# Patient Record
Sex: FEMALE | Race: AMERICAN INDIAN OR ALASKA NATIVE | NOT HISPANIC OR LATINO | Employment: FULL TIME | URBAN - NONMETROPOLITAN AREA
[De-identification: names, ages, dates, MRNs, and addresses within clinical notes are randomized per-mention and may not be internally consistent; named-entity substitution may affect disease eponyms.]

---

## 2017-05-07 ENCOUNTER — HOSPITAL ENCOUNTER (EMERGENCY)
Facility: HOSPITAL | Age: 32
Discharge: HOME | End: 2017-05-07
Attending: PHYSICIAN ASSISTANT | Admitting: PHYSICIAN ASSISTANT
Payer: MEDICAID

## 2017-05-07 VITALS
HEIGHT: 65 IN | OXYGEN SATURATION: 99 % | DIASTOLIC BLOOD PRESSURE: 74 MMHG | RESPIRATION RATE: 16 BRPM | TEMPERATURE: 98.5 F | HEART RATE: 55 BPM | SYSTOLIC BLOOD PRESSURE: 130 MMHG

## 2017-05-07 DIAGNOSIS — R07.89 ATYPICAL CHEST PAIN: ICD-10-CM

## 2017-05-07 LAB
ALBUMIN SERPL-MCNC: 3.7 G/DL (ref 3.4–5)
ALP SERPL-CCNC: 117 U/L (ref 40–150)
ALT SERPL W P-5'-P-CCNC: 22 U/L (ref 0–50)
ANION GAP SERPL CALCULATED.3IONS-SCNC: 10 MMOL/L (ref 3–14)
AST SERPL W P-5'-P-CCNC: 10 U/L (ref 0–45)
BASOPHILS # BLD AUTO: 0 10E9/L (ref 0–0.2)
BASOPHILS NFR BLD AUTO: 0.2 %
BILIRUB SERPL-MCNC: 0.3 MG/DL (ref 0.2–1.3)
BUN SERPL-MCNC: 7 MG/DL (ref 7–30)
CALCIUM SERPL-MCNC: 9.4 MG/DL (ref 8.5–10.1)
CHLORIDE SERPL-SCNC: 107 MMOL/L (ref 94–109)
CO2 SERPL-SCNC: 23 MMOL/L (ref 20–32)
CREAT SERPL-MCNC: 0.53 MG/DL (ref 0.52–1.04)
D DIMER PPP DDU-MCNC: <200 NG/ML D-DU (ref 0–300)
DIFFERENTIAL METHOD BLD: ABNORMAL
EOSINOPHIL # BLD AUTO: 0.1 10E9/L (ref 0–0.7)
EOSINOPHIL NFR BLD AUTO: 0.5 %
ERYTHROCYTE [DISTWIDTH] IN BLOOD BY AUTOMATED COUNT: 14.1 % (ref 10–15)
GFR SERPL CREATININE-BSD FRML MDRD: NORMAL ML/MIN/1.7M2
GLUCOSE SERPL-MCNC: 89 MG/DL (ref 70–99)
HCT VFR BLD AUTO: 41.3 % (ref 35–47)
HGB BLD-MCNC: 13.2 G/DL (ref 11.7–15.7)
IMM GRANULOCYTES # BLD: 0 10E9/L (ref 0–0.4)
IMM GRANULOCYTES NFR BLD: 0.3 %
LYMPHOCYTES # BLD AUTO: 3.3 10E9/L (ref 0.8–5.3)
LYMPHOCYTES NFR BLD AUTO: 34.8 %
MCH RBC QN AUTO: 26.3 PG (ref 26.5–33)
MCHC RBC AUTO-ENTMCNC: 32 G/DL (ref 31.5–36.5)
MCV RBC AUTO: 82 FL (ref 78–100)
MONOCYTES # BLD AUTO: 0.6 10E9/L (ref 0–1.3)
MONOCYTES NFR BLD AUTO: 5.9 %
NEUTROPHILS # BLD AUTO: 5.5 10E9/L (ref 1.6–8.3)
NEUTROPHILS NFR BLD AUTO: 58.3 %
NRBC # BLD AUTO: 0 10*3/UL
NRBC BLD AUTO-RTO: 0 /100
PLATELET # BLD AUTO: 368 10E9/L (ref 150–450)
POTASSIUM SERPL-SCNC: 4 MMOL/L (ref 3.4–5.3)
PROT SERPL-MCNC: 8.2 G/DL (ref 6.8–8.8)
RBC # BLD AUTO: 5.02 10E12/L (ref 3.8–5.2)
SODIUM SERPL-SCNC: 140 MMOL/L (ref 133–144)
TROPONIN I SERPL-MCNC: NORMAL UG/L (ref 0–0.04)
WBC # BLD AUTO: 9.5 10E9/L (ref 4–11)

## 2017-05-07 PROCEDURE — 85025 COMPLETE CBC W/AUTO DIFF WBC: CPT | Performed by: PHYSICIAN ASSISTANT

## 2017-05-07 PROCEDURE — 85379 FIBRIN DEGRADATION QUANT: CPT | Performed by: PHYSICIAN ASSISTANT

## 2017-05-07 PROCEDURE — 71020 ZZHC CHEST TWO VIEWS, FRONT/LAT: CPT | Mod: TC

## 2017-05-07 PROCEDURE — 84484 ASSAY OF TROPONIN QUANT: CPT | Performed by: PHYSICIAN ASSISTANT

## 2017-05-07 PROCEDURE — 99285 EMERGENCY DEPT VISIT HI MDM: CPT | Mod: 25

## 2017-05-07 PROCEDURE — 80053 COMPREHEN METABOLIC PANEL: CPT | Performed by: PHYSICIAN ASSISTANT

## 2017-05-07 PROCEDURE — 99284 EMERGENCY DEPT VISIT MOD MDM: CPT | Performed by: PHYSICIAN ASSISTANT

## 2017-05-07 PROCEDURE — 93010 ELECTROCARDIOGRAM REPORT: CPT | Performed by: INTERNAL MEDICINE

## 2017-05-07 PROCEDURE — 36415 COLL VENOUS BLD VENIPUNCTURE: CPT | Performed by: PHYSICIAN ASSISTANT

## 2017-05-07 PROCEDURE — 93005 ELECTROCARDIOGRAM TRACING: CPT

## 2017-05-07 PROCEDURE — 96374 THER/PROPH/DIAG INJ IV PUSH: CPT

## 2017-05-07 PROCEDURE — 25000128 H RX IP 250 OP 636: Performed by: PHYSICIAN ASSISTANT

## 2017-05-07 RX ORDER — HYDROCODONE BITARTRATE AND ACETAMINOPHEN 5; 325 MG/1; MG/1
1-2 TABLET ORAL EVERY 4 HOURS PRN
Qty: 15 TABLET | Refills: 0 | Status: SHIPPED | OUTPATIENT
Start: 2017-05-07 | End: 2017-07-20

## 2017-05-07 RX ORDER — KETOROLAC TROMETHAMINE 30 MG/ML
30 INJECTION, SOLUTION INTRAMUSCULAR; INTRAVENOUS ONCE
Status: COMPLETED | OUTPATIENT
Start: 2017-05-07 | End: 2017-05-07

## 2017-05-07 RX ADMIN — KETOROLAC TROMETHAMINE 30 MG: 30 INJECTION, SOLUTION INTRAMUSCULAR at 11:14

## 2017-05-07 ASSESSMENT — ENCOUNTER SYMPTOMS
FEVER: 0
FATIGUE: 0
BACK PAIN: 1
APPETITE CHANGE: 0
ABDOMINAL PAIN: 0
ACTIVITY CHANGE: 0
SHORTNESS OF BREATH: 0
HEADACHES: 0
DIZZINESS: 0
NAUSEA: 0
VOMITING: 0
LIGHT-HEADEDNESS: 0
PALPITATIONS: 0
CHEST TIGHTNESS: 0

## 2017-05-07 NOTE — ED NOTES
Pt instructed to not drive, drink etoh, or do anything requiring alertness while taking meds, to take with food, and fruit,fiber, vegetables and liquids to avoid constipation. Pt verbalizes understanding.

## 2017-05-07 NOTE — ED PROVIDER NOTES
History     Chief Complaint   Patient presents with     Chest Pain     chest pain for the last week right anterior chest that is no longer intermittent.      The history is provided by the patient.     Jaycee Campos is a 31 year old female who presented to the ED ambulatory for evaluation of chest pain.  The pain is described as sharp and located just right of the sternum.  She has been experiencing the pain intermittently for approx one week.  Last night the pains became constant.  No fevers.  No cough.  No dyspnea.  Pain is 6/10 scale.  Radiates to the back and right shoulder.  Improved with rasing her arm over her head.      CAD risk: early MI in father.      I have reviewed the Medications, Allergies, Past Medical and Surgical History, and Social History in the Epic system.    Review of Systems   Constitutional: Negative for activity change, appetite change, fatigue and fever.   HENT: Negative.    Respiratory: Negative for chest tightness and shortness of breath.    Cardiovascular: Positive for chest pain. Negative for palpitations and leg swelling.   Gastrointestinal: Negative for abdominal pain, nausea and vomiting.   Genitourinary: Negative.    Musculoskeletal: Positive for back pain.   Skin: Negative.    Neurological: Negative for dizziness, light-headedness and headaches.       Physical Exam      Physical Exam   Constitutional: She is oriented to person, place, and time. She appears well-developed and well-nourished.   HENT:   Head: Normocephalic and atraumatic.   Right Ear: External ear normal.   Left Ear: External ear normal.   Eyes: Conjunctivae and EOM are normal.   Cardiovascular: Normal rate and regular rhythm.    Pulmonary/Chest: Effort normal and breath sounds normal. No respiratory distress. She exhibits no tenderness.   Abdominal: Soft. There is no tenderness. There is no guarding.   No epigastric or RUQ pain    Neurological: She is alert and oriented to person, place, and time.   Skin: Skin  is warm and dry.   Psychiatric: She has a normal mood and affect.   Nursing note and vitals reviewed.      ED Course     ED Course     Procedures        EKG shows a NSR without ST or T wave concerns.      CXR shows no widened mediastinum, pneumothorax, or focal consolidation.     PERC Rule for risk stratifying PE to low risk (calculator)  Background  Risk stratifies patients to low risk of PE if all 8 criteria are present including age <50, heart rate <100, O2 Sat >94%, no unilateral leg edema, no hemoptysis, no recent surgery or trauma, no prior VTE, and no hormone use.  Data  31 year old   does not have a problem list on file.  @cmedp@   has no past surgical history on file.  Pulse: 68  SpO2: 97 %  Criteria   Of  8 possible items (all criteria must be present):  Age <50 years  Heart rate <100 bpm  Oxygen Saturation >94%  No unilateral leg swelling  No hemoptysis  No surgery or trauma within 4 weeks  No prior DVT or PE  No hormone use (oral, transdermal and intravaginal estrogens)  Interpretation  All eight criteria are met AND low clinical PE suspicion: No further evaluation for PE required          HEART Score  Background  Calculates the overall risk of adverse event in patient's presenting with chest pain.  Based on 5 criteria (each assigned 0-2 points) including suspiciousness of history, EKG, age, risk factors and troponin.    Data  31 year old female   does not have a problem list on file.   reports that she has never smoked. She does not have any smokeless tobacco history on file.  family history is not on file.  Lab Results   Component Value Date    TROPI  05/07/2017     <0.015  The 99th percentile for upper reference range is 0.045 ug/L.  Troponin values in   the range of 0.045 - 0.120 ug/L may be associated with risks of adverse   clinical events.       Criteria   0-2 points for each of 5 items (maximum of 10 points):  Score 1- History moderately suspicious for coronary syndrome  Score 0- EKG  Normal  Score 0- Age <45 years old  Score 1- One to 2 risk factors for atherosclerotic disease  Score 0- Within normal limits for troponin levels  Interpretation  Risk of adverse outcome  Heart Score: 2  Total Score 0-3- Adverse Outcome Risk 2.5% - Supports early discharge with appropriate follow-up    Medications   sodium chloride (PF) 0.9% PF flush 3 mL (not administered)   sodium chloride (PF) 0.9% PF flush 3 mL (3 mLs Intracatheter Given 5/7/17 1114)   ketorolac (TORADOL) injection 30 mg (30 mg Intravenous Given 5/7/17 1114)     Results for orders placed or performed during the hospital encounter of 05/07/17 (from the past 24 hour(s))   CBC with platelets differential   Result Value Ref Range    WBC 9.5 4.0 - 11.0 10e9/L    RBC Count 5.02 3.8 - 5.2 10e12/L    Hemoglobin 13.2 11.7 - 15.7 g/dL    Hematocrit 41.3 35.0 - 47.0 %    MCV 82 78 - 100 fl    MCH 26.3 (L) 26.5 - 33.0 pg    MCHC 32.0 31.5 - 36.5 g/dL    RDW 14.1 10.0 - 15.0 %    Platelet Count 368 150 - 450 10e9/L    Diff Method Automated Method     % Neutrophils 58.3 %    % Lymphocytes 34.8 %    % Monocytes 5.9 %    % Eosinophils 0.5 %    % Basophils 0.2 %    % Immature Granulocytes 0.3 %    Nucleated RBCs 0 0 /100    Absolute Neutrophil 5.5 1.6 - 8.3 10e9/L    Absolute Lymphocytes 3.3 0.8 - 5.3 10e9/L    Absolute Monocytes 0.6 0.0 - 1.3 10e9/L    Absolute Eosinophils 0.1 0.0 - 0.7 10e9/L    Absolute Basophils 0.0 0.0 - 0.2 10e9/L    Abs Immature Granulocytes 0.0 0 - 0.4 10e9/L    Absolute Nucleated RBC 0.0    D-Dimer (FV Range)   Result Value Ref Range    D-Dimer ng/mL <200 0 - 300 ng/ml D-DU   Comprehensive metabolic panel   Result Value Ref Range    Sodium 140 133 - 144 mmol/L    Potassium 4.0 3.4 - 5.3 mmol/L    Chloride 107 94 - 109 mmol/L    Carbon Dioxide 23 20 - 32 mmol/L    Anion Gap 10 3 - 14 mmol/L    Glucose 89 70 - 99 mg/dL    Urea Nitrogen 7 7 - 30 mg/dL    Creatinine 0.53 0.52 - 1.04 mg/dL    GFR Estimate >90  Non  GFR Calc    >60 mL/min/1.7m2    GFR Estimate If Black >90   GFR Calc   >60 mL/min/1.7m2    Calcium 9.4 8.5 - 10.1 mg/dL    Bilirubin Total 0.3 0.2 - 1.3 mg/dL    Albumin 3.7 3.4 - 5.0 g/dL    Protein Total 8.2 6.8 - 8.8 g/dL    Alkaline Phosphatase 117 40 - 150 U/L    ALT 22 0 - 50 U/L    AST 10 0 - 45 U/L   Troponin I   Result Value Ref Range    Troponin I ES  0.000 - 0.045 ug/L     <0.015  The 99th percentile for upper reference range is 0.045 ug/L.  Troponin values in   the range of 0.045 - 0.120 ug/L may be associated with risks of adverse   clinical events.             Critical Care time:  none               Labs Ordered and Resulted from Time of ED Arrival Up to the Time of Departure from the ED   CBC WITH PLATELETS DIFFERENTIAL - Abnormal; Notable for the following:        Result Value    MCH 26.3 (*)     All other components within normal limits   D-DIMER (FV RANGE)   COMPREHENSIVE METABOLIC PANEL   TROPONIN I   PERIPHERAL IV CATHETER       Assessments & Plan (with Medical Decision Making)   Findings as above.  Long discussion regarding chest pain.  Safe for discharge.  Pain medications as needed. See discharge instructions. Return here for ANY other concern or questions.     I have reviewed the nursing notes.    I have reviewed the findings, diagnosis, plan and need for follow up with the patient.    New Prescriptions    HYDROCODONE-ACETAMINOPHEN (NORCO) 5-325 MG PER TABLET    Take 1-2 tablets by mouth every 4 hours as needed for moderate to severe pain       Final diagnoses:   Atypical chest pain       5/7/2017   [facility]     Susie Encinas PA-C  05/07/17 1204       Susie Encinas PA-C  05/07/17 1657

## 2017-05-07 NOTE — ED NOTES
Pt presents with one week hx of intermittent right ant. Chest pain that today has become constant. Movement/deep breathing do not affect the pain. Pt states her father had and MI at age 53y/o and he suffered a stroke while recovering in the hospital from the MI and  (in ).

## 2017-05-07 NOTE — DISCHARGE INSTRUCTIONS
*CHEST PAIN, UNCERTAIN CAUSE    Based on your exam today, the exact cause of your chest pain is not certain. Your condition does not seem serious at this time, and your pain does not appear to be coming from your heart. However, sometimes the signs of a serious problem take more time to appear. Therefore, watch for the warning signs listed below.  HOME CARE:  1. Rest today and avoid strenuous activity.  2. Take any prescribed medicine as directed.  FOLLOW UP with your doctor in 1-3 days.   GET PROMPT MEDICAL ATTENTION if any of the following occur:    A change in the type of pain: if it feels different, becomes more severe, lasts longer, or begins to spread into your shoulder, arm, neck, jaw or back    Shortness of breath or increased pain with breathing    Weakness, dizziness, or fainting    Cough with blood or dark colored sputum (phlegm)    Fever over 101  F (38.3  C)    Swelling, pain or redness in one leg    2472-7098 [place] [address]. All rights reserved. This information is not intended as a substitute for professional medical care. Always follow your healthcare professional's instructions.    As we discussed, there are a multitude of reasons for chest pain.  It does not appear that your pain is from an emergent or concerning source today.      This does not mean that nothing is wrong.  It simply means that I could not find anything emergently wrong at this point.     Please establish care in the clinic of your choice.     You may continue ibuprofen as needed for pain.     Hydrocodone sparingly.     Return HERE for ANY worsening pain, shortness of breath, or any other concerns or questions.

## 2017-05-07 NOTE — ED AVS SNAPSHOT
[facility]    [address] [address]    Phone:  [phone]                                       Jaycee Campos   MRN: [id number]    Department:  [facility]   Date of Visit:  5/7/2017           Patient Information     Date Of Birth          1985        Your diagnoses for this visit were:     Atypical chest pain        You were seen by Susie Encinas PA-C.      Follow-up Information     Follow up with [facility].    Specialty:  EMERGENCY MEDICINE    Why:  If symptoms worsen    Contact information:    [address] [address] [phone]    Additional information:    From [place] Area: Take [address]. Turn left at [address]. Turn left at the first stoplight on [address]. At the first stop sign, take a right onto [address]. Take a left into the parking lot and continue through until you reach the North enterance of the building.       From [place]: Take [address]. Take the [address] ramp towards [place]. Turn left onto [address]. Take a slight right onto [address]. Turn left at the first stoplight on [address]. At the first stop sign, take a right onto [address]. Take a left into the parking lot and continue through until you reach the North enterance of the building.       From [place]: Take [address]. Take a right at [address]. At the first stop sign, take a right onto [address]. Take a left into the parking lot and continue through until you reach the North enterance of the building.         Discharge Instructions          *CHEST PAIN, UNCERTAIN CAUSE    Based on your exam today, the exact cause of your chest pain is not certain. Your condition does not seem serious at this time, and your pain does not appear to be coming from your heart. However, sometimes the signs of a serious problem take more time to appear.  Therefore, watch for the warning signs listed below.  HOME CARE:  1. Rest today and avoid strenuous activity.  2. Take any prescribed medicine as directed.  FOLLOW UP with your doctor in 1-3 days.   GET PROMPT MEDICAL ATTENTION if any of the following occur:    A change in the type of pain: if it feels different, becomes more severe, lasts longer, or begins to spread into your shoulder, arm, neck, jaw or back    Shortness of breath or increased pain with breathing    Weakness, dizziness, or fainting    Cough with blood or dark colored sputum (phlegm)    Fever over 101  F (38.3  C)    Swelling, pain or redness in one leg    9102-8245 [facility], [address] [place] [address], [place] [address]. All rights reserved. This information is not intended as a substitute for professional medical care. Always follow your healthcare professional's instructions.    As we discussed, there are a multitude of reasons for chest pain.  It does not appear that your pain is from an emergent or concerning source today.      This does not mean that nothing is wrong.  It simply means that I could not find anything emergently wrong at this point.     Please establish care in the clinic of your choice.     You may continue ibuprofen as needed for pain.     Hydrocodone sparingly.     Return HERE for ANY worsening pain, shortness of breath, or any other concerns or questions.        Review of your medicines      START taking        Dose / Directions Last dose taken    HYDROcodone-acetaminophen 5-325 MG per tablet   Commonly known as:  NORCO   Dose:  1-2 tablet   Quantity:  15 tablet        Take 1-2 tablets by mouth every 4 hours as needed for moderate to severe pain   Refills:  0          Our records show that you are taking the medicines listed below. If these are incorrect, please call your family doctor or clinic.        Dose / Directions Last dose taken    ADVIL PO   Dose:  800 mg        Take 800 mg by mouth every 8 hours as needed for  moderate pain   Refills:  0                Prescriptions were sent or printed at these locations (1 Prescription)                   Other Prescriptions                Printed at Department/Unit printer (1 of 1)         HYDROcodone-acetaminophen (NORCO) 5-325 MG per tablet                Procedures and tests performed during your visit     CBC with platelets differential    Comprehensive metabolic panel    D-Dimer (FV Range)    EKG 12-lead, tracing only    Peripheral IV catheter    Troponin I    XR Chest 2 Views      Orders Needing Specimen Collection     None      Pending Results     Date and Time Order Name Status Description    5/7/2017 1101 XR Chest 2 Views In process             Pending Culture Results     No orders found from 5/5/2017 to 5/8/2017.            Thank you for choosing [facility]       Thank you for choosing [facility] for your care. Our goal is always to provide you with excellent care. Hearing back from our patients is one way we can continue to improve our services. Please take a few minutes to complete the written survey that you may receive in the mail after you visit with us. Thank you!        [facility] Information     [facility] gives you secure access to your electronic health record. If you see a primary care provider, you can also send messages to your care team and make appointments. If you have questions, please call your primary care clinic.  If you do not have a primary care provider, please call [phone] and they will assist you.        Care EveryWhere ID     This is your Care EveryWhere ID. This could be used by other organizations to access your [facility] medical records  [id number]        After Visit Summary       This is your record. Keep this with you and show to your community pharmacist(s) and doctor(s) at your next visit.

## 2017-05-07 NOTE — ED AVS SNAPSHOT
[facility]    [address] [address]    Phone:  [phone]                                       Jaycee Campos   MRN: [id number]    Department:  [facility]   Date of Visit:  5/7/2017           After Visit Summary Signature Page     I have received my discharge instructions, and my questions have been answered. I have discussed any challenges I see with this plan with the nurse or doctor.    ..........................................................................................................................................  Patient/Patient Representative Signature      ..........................................................................................................................................  Patient Representative Print Name and Relationship to Patient    ..................................................               ................................................  Date                                            Time    ..........................................................................................................................................  Reviewed by Signature/Title    ...................................................              ..............................................  Date                                                            Time

## 2017-07-20 ENCOUNTER — HOSPITAL ENCOUNTER (EMERGENCY)
Facility: HOSPITAL | Age: 32
Discharge: HOME | End: 2017-07-20
Attending: NURSE PRACTITIONER | Admitting: NURSE PRACTITIONER
Payer: COMMERCIAL

## 2017-07-20 VITALS
DIASTOLIC BLOOD PRESSURE: 94 MMHG | RESPIRATION RATE: 16 BRPM | TEMPERATURE: 98.4 F | OXYGEN SATURATION: 100 % | HEIGHT: 65 IN | SYSTOLIC BLOOD PRESSURE: 149 MMHG

## 2017-07-20 DIAGNOSIS — S93.505A: ICD-10-CM

## 2017-07-20 PROCEDURE — 99213 OFFICE O/P EST LOW 20 MIN: CPT

## 2017-07-20 PROCEDURE — 73660 X-RAY EXAM OF TOE(S): CPT | Mod: TC,LT

## 2017-07-20 PROCEDURE — 99213 OFFICE O/P EST LOW 20 MIN: CPT | Performed by: NURSE PRACTITIONER

## 2017-07-20 NOTE — LETTER
[facility]  [address] [address]  Phone: [phone]    July 20, 2017        Jaycee Campos  [address] [place] [id number]          To whom it may concern:    RE: Jaycee Campos    Patient was seen and treated today at our clinic and her  Martinez Campos missed work.    Please contact me for questions or concerns.      Sincerely,        Jessie Duran CNP

## 2017-07-20 NOTE — ED AVS SNAPSHOT
[facility]    [address] [place] [address]    Phone:  [phone]                                       Jaycee Campos   MRN: [id number]    Department:  [facility]   Date of Visit:  7/20/2017           Patient Information     Date Of Birth          1985        Your diagnoses for this visit were:     Sprain of fifth toe, left, initial encounter        You were seen by Marylin Rae NP and Jessie Duran NP.      Follow-up Information     Follow up with [facility].    Specialty:  EMERGENCY MEDICINE    Why:  As needed, If symptoms worsen    Contact information:    [address] [place] [address] [phone]    Additional information:    From [facility]: Take [address]. Turn left at [address]. Turn left at the first stoplight on [address]. At the first stop sign, take a right onto [address]. Take a left into the parking lot and continue through until you reach the North enterance of the building.       From [place]: Take [address]. Take the [address] ramp towards [place]. Turn left onto [address]. Take a slight right onto [address] [facility]. Turn left at the first stoplight on [address]. At the first stop sign, take a right onto [address]. Take a left into the parking lot and continue through until you reach the North enterance of the building.       From [place]: Take [address]. Take a right at [address]. At the first stop sign, take a right onto [address]. Take a left into the parking lot and continue through until you reach the North enterance of the building.         Discharge Instructions         Toe Sprain  A sprain is a stretching or tearing of the ligaments that hold a joint together. There are no broken bones. Sprains generally take from 3-6 weeks to heal. A toe sprain may be treated by taping the injured toe to the next toe.  This is called buddy taping. This protects the injured toe and holds it in position. Mild sprains may not need any additional support. If the toenail has been hurt badly, it may fall off in 1-2 weeks. A new one will usually start to grow back within a month.    Home care    Keep your leg elevated when sitting or lying down. This is very important during the first 48 hours to reduce swelling. Stay off the injured foot as much as possible until you can walk on it without pain. If needed, you may use crutches during the first week for this purpose. Crutches can be rented at many pharmacies or surgical/orthopedic supply stores.    You may be given a cast shoe to wear to prevent movement in your toe. If not, you can use a sandal or any shoe that does not put pressure on the injured toe until the swelling and pain go away. If using a sandal, be careful not to hit your foot against anything, since another injury could make the sprain worse.    Apply an ice pack over the injured area for 15 to 20 minutes every 3 to 6 hours. You should do this for the first 24 to 48 hours. You can make an ice pack by filling a plastic bag that seals at the top with ice cubes and then wrapping it with a thin towel. Continue to use ice packs for relief of pain and swelling as needed. As the ice melts, be careful to avoid getting your wrap, splint, or cast wet. As the ice melts, be careful to avoid getting any wrap, splint, tape, or cast wet. After 48 hours, apply heat from a warm shower or bath for 20 minutes several times daily. Alternating ice and heat may also be helpful.    If buddy tape was applied and it becomes wet or dirty, change it. You may replace it with paper, plastic or cloth tape. Cloth tape and paper tapes must be kept dry. Apply gauze or cotton padding between the toes, especially near webbed area. This will help prevent the skin from getting moist and breaking down. Keep the buddy tape in place for at least 4 weeks, or as  advised by your healthcare provider.    You may use over-the-counter pain medicine to control pain, unless another pain medicine was prescribed. If you have chronic liver or kidney disease or ever had a stomach ulcer or GI bleeding, talk with your healthcare provider before using these medicines.    You may return to sports after healing, when you can run without pain.  Follow-up care  Follow up with your with your healthcare provider as advised. Sometimes fractures don t show up on the first X-ray. Bruises and sprains can sometimes hurt as much as a fracture. These injuries can take time to heal completely. If your symptoms don t improve or they get worse, talk with your healthcare provider. You may need a repeat X-ray. If X-rays were taken, you will be told of any new findings that may affect your care.  When to seek medical advice  Call your healthcare provider right away if any of these occur:    Redness, warmth, or fluid drainage from your toe    Pain or swelling increases    Toes become cold, blue, numb, or tingly  Date Last Reviewed: 11/20/2015 2000-2017 [employer] [address] [place] [address]. All rights reserved. This information is not intended as a substitute for professional medical care. Always follow your healthcare professional's instructions.             Review of your medicines      Our records show that you are taking the medicines listed below. If these are incorrect, please call your family doctor or clinic.        Dose / Directions Last dose taken    ADVIL PO   Dose:  800 mg        Take 800 mg by mouth every 8 hours as needed for moderate pain   Refills:  0                Procedures and tests performed during your visit     XR Toe Left G/E 2 Views      Orders Needing Specimen Collection     None      Pending Results     Date and Time Order Name Status Description    7/20/2017 1309 XR Toe Left G/E 2 Views Preliminary             Pending Culture Results     No  orders found from 7/18/2017 to 7/21/2017.            Thank you for choosing [facility]       Thank you for choosing [facility] for your care. Our goal is always to provide you with excellent care. Hearing back from our patients is one way we can continue to improve our services. Please take a few minutes to complete the written survey that you may receive in the mail after you visit with us. Thank you!        [employer] Information     [employer] gives you secure access to your electronic health record. If you see a primary care provider, you can also send messages to your care team and make appointments. If you have questions, please call your primary care clinic.  If you do not have a primary care provider, please call [phone] and they will assist you.        Care EveryWhere ID     This is your Care EveryWhere ID. This could be used by other organizations to access your [facility] medical records  [id number]        Equal Access to Services     CATRACHO ALEXIS : Lenny Toney, ginna velarde, jose antonio tolbert. So [facility] [phone].    ATENCIÓN: Si habla español, tiene a falcon disposición servicios gratuitos de asistencia lingüística. Llame al [phone].    We comply with applicable federal civil rights laws and [place] laws. We do not discriminate on the basis of race, color, national origin, age, disability sex, sexual orientation or gender identity.            After Visit Summary       This is your record. Keep this with you and show to your community pharmacist(s) and doctor(s) at your next visit.

## 2017-07-20 NOTE — ED AVS SNAPSHOT
[facility]    [address] [address]    Phone:  [phone]                                       Jaycee Campos   MRN: [id number]    Department:  [facility]   Date of Visit:  7/20/2017           After Visit Summary Signature Page     I have received my discharge instructions, and my questions have been answered. I have discussed any challenges I see with this plan with the nurse or doctor.    ..........................................................................................................................................  Patient/Patient Representative Signature      ..........................................................................................................................................  Patient Representative Print Name and Relationship to Patient    ..................................................               ................................................  Date                                            Time    ..........................................................................................................................................  Reviewed by Signature/Title    ...................................................              ..............................................  Date                                                            Time

## 2017-07-20 NOTE — DISCHARGE INSTRUCTIONS
Toe Sprain  A sprain is a stretching or tearing of the ligaments that hold a joint together. There are no broken bones. Sprains generally take from 3-6 weeks to heal. A toe sprain may be treated by taping the injured toe to the next toe. This is called buddy taping. This protects the injured toe and holds it in position. Mild sprains may not need any additional support. If the toenail has been hurt badly, it may fall off in 1-2 weeks. A new one will usually start to grow back within a month.    Home care    Keep your leg elevated when sitting or lying down. This is very important during the first 48 hours to reduce swelling. Stay off the injured foot as much as possible until you can walk on it without pain. If needed, you may use crutches during the first week for this purpose. Crutches can be rented at many pharmacies or surgical/orthopedic supply stores.    You may be given a cast shoe to wear to prevent movement in your toe. If not, you can use a sandal or any shoe that does not put pressure on the injured toe until the swelling and pain go away. If using a sandal, be careful not to hit your foot against anything, since another injury could make the sprain worse.    Apply an ice pack over the injured area for 15 to 20 minutes every 3 to 6 hours. You should do this for the first 24 to 48 hours. You can make an ice pack by filling a plastic bag that seals at the top with ice cubes and then wrapping it with a thin towel. Continue to use ice packs for relief of pain and swelling as needed. As the ice melts, be careful to avoid getting your wrap, splint, or cast wet. As the ice melts, be careful to avoid getting any wrap, splint, tape, or cast wet. After 48 hours, apply heat from a warm shower or bath for 20 minutes several times daily. Alternating ice and heat may also be helpful.    If buddy tape was applied and it becomes wet or dirty, change it. You may replace it with paper, plastic or cloth tape. Cloth tape  and paper tapes must be kept dry. Apply gauze or cotton padding between the toes, especially near webbed area. This will help prevent the skin from getting moist and breaking down. Keep the bill tape in place for at least 4 weeks, or as advised by your healthcare provider.    You may use over-the-counter pain medicine to control pain, unless another pain medicine was prescribed. If you have chronic liver or kidney disease or ever had a stomach ulcer or GI bleeding, talk with your healthcare provider before using these medicines.    You may return to sports after healing, when you can run without pain.  Follow-up care  Follow up with your with your healthcare provider as advised. Sometimes fractures don t show up on the first X-ray. Bruises and sprains can sometimes hurt as much as a fracture. These injuries can take time to heal completely. If your symptoms don t improve or they get worse, talk with your healthcare provider. You may need a repeat X-ray. If X-rays were taken, you will be told of any new findings that may affect your care.  When to seek medical advice  Call your healthcare provider right away if any of these occur:    Redness, warmth, or fluid drainage from your toe    Pain or swelling increases    Toes become cold, blue, numb, or tingly  Date Last Reviewed: 11/20/2015 2000-2017 [employer]. [address] [place] [address], [place] [address]. All rights reserved. This information is not intended as a substitute for professional medical care. Always follow your healthcare professional's instructions.

## 2017-07-20 NOTE — ED PROVIDER NOTES
"  History     Chief Complaint   Patient presents with     Toe Pain     5th toe left foot \"got caught on door as I was trying to shut it fast\".     The history is provided by the patient. No  was used.     Jaycee Campos is a 31 year old female who caught her left 5 th toe in a door as she was closing it and her toe bent laterally.  She noted that it hurt last night is minmally worsening in pain , swelling and bruising is noted today.  She did not break the skin.  Has not done anything to tx    I have reviewed the Medications, Allergies, Past Medical and Surgical History, and Social History in the Epic system.        Review of Systems   Constitutional: Negative.    Respiratory: Negative.    Cardiovascular: Negative.    Gastrointestinal: Negative.    Genitourinary: Negative.    Musculoskeletal: Positive for arthralgias.        Left fifth toe pain and swelling   Skin: Negative.    Neurological: Negative for numbness.       Physical Exam   BP: 149/94  Heart Rate: 68  Temp: 98.4  F (36.9  C)  Resp: 16  Height: 165.1 cm (5' 5\")  SpO2: 100 %  Physical Exam   Constitutional: She is oriented to person, place, and time. She appears well-developed and well-nourished.   HENT:   Head: Normocephalic and atraumatic.   Eyes: Conjunctivae are normal. Right eye exhibits no discharge. Left eye exhibits no discharge.   Neck: Normal range of motion.   Cardiovascular: Normal rate.    Pulmonary/Chest: Effort normal.   Musculoskeletal: She exhibits tenderness.   TTP over the left fifth toe she is able to move it well, full ROM of other toes, foot and ankle as well There is no joint tenderness to palpation , some minimal swelling over the toe generalized , sensation intact, distal pulses intact, CFP brisk to the nailbeds  Gait is intact   Neurological: She is alert and oriented to person, place, and time.   Skin: Skin is warm and dry.   Nursing note and vitals reviewed.      ED Course     ED Course     Procedures      "   Xray is obtained and reviewed.  There are no acute bony abnotrmalities/rads reading poensing.  Will be called for any abnormalities    Labs Ordered and Resulted from Time of ED Arrival Up to the Time of Departure from the ED - No data to display    Assessments & Plan (with Medical Decision Making)     I have reviewed the nursing notes.  Minor sprain or injury of the left fifth toe.  Would advise rest, ice , compression and elevation. Nsaid's and follow up in the office for worsening symptoms or no slow resolution.  Pt  verbalizes understanding and agreement with plan.    I have reviewed the findings, diagnosis, plan and need for follow up with the patient.      Discharge Medication List as of 7/20/2017  1:36 PM          Final diagnoses:   Sprain of fifth toe, left, initial encounter       7/20/2017   [facility]     Jessie Duran NP  07/24/17 1242

## 2017-07-24 ASSESSMENT — ENCOUNTER SYMPTOMS
CONSTITUTIONAL NEGATIVE: 1
NUMBNESS: 0
ARTHRALGIAS: 1
GASTROINTESTINAL NEGATIVE: 1
RESPIRATORY NEGATIVE: 1
CARDIOVASCULAR NEGATIVE: 1

## 2017-11-12 ENCOUNTER — HEALTH MAINTENANCE LETTER (OUTPATIENT)
Age: 32
End: 2017-11-12

## 2018-04-09 ENCOUNTER — OFFICE VISIT (OUTPATIENT)
Dept: FAMILY MEDICINE | Facility: OTHER | Age: 33
End: 2018-04-09
Attending: NURSE PRACTITIONER
Payer: COMMERCIAL

## 2018-04-09 VITALS
TEMPERATURE: 98.2 F | DIASTOLIC BLOOD PRESSURE: 74 MMHG | OXYGEN SATURATION: 98 % | HEIGHT: 65 IN | WEIGHT: 222.2 LBS | SYSTOLIC BLOOD PRESSURE: 118 MMHG | BODY MASS INDEX: 37.02 KG/M2 | RESPIRATION RATE: 16 BRPM | HEART RATE: 69 BPM

## 2018-04-09 DIAGNOSIS — Z76.89 ENCOUNTER TO ESTABLISH CARE: Primary | ICD-10-CM

## 2018-04-09 PROCEDURE — 99202 OFFICE O/P NEW SF 15 MIN: CPT | Performed by: NURSE PRACTITIONER

## 2018-04-09 RX ORDER — LEVONORGESTREL/ETHIN.ESTRADIOL 0.1-0.02MG
1 TABLET ORAL DAILY
COMMUNITY
End: 2019-05-06

## 2018-04-09 ASSESSMENT — ANXIETY QUESTIONNAIRES
4. TROUBLE RELAXING: NOT AT ALL
7. FEELING AFRAID AS IF SOMETHING AWFUL MIGHT HAPPEN: NOT AT ALL
2. NOT BEING ABLE TO STOP OR CONTROL WORRYING: NOT AT ALL
5. BEING SO RESTLESS THAT IT IS HARD TO SIT STILL: NOT AT ALL
GAD7 TOTAL SCORE: 0
1. FEELING NERVOUS, ANXIOUS, OR ON EDGE: NOT AT ALL
3. WORRYING TOO MUCH ABOUT DIFFERENT THINGS: NOT AT ALL
6. BECOMING EASILY ANNOYED OR IRRITABLE: NOT AT ALL
IF YOU CHECKED OFF ANY PROBLEMS ON THIS QUESTIONNAIRE, HOW DIFFICULT HAVE THESE PROBLEMS MADE IT FOR YOU TO DO YOUR WORK, TAKE CARE OF THINGS AT HOME, OR GET ALONG WITH OTHER PEOPLE: NOT DIFFICULT AT ALL

## 2018-04-09 ASSESSMENT — PAIN SCALES - GENERAL: PAINLEVEL: NO PAIN (0)

## 2018-04-09 NOTE — PATIENT INSTRUCTIONS
ASSESSMENT/PLAN:       1. Encounter to establish care  Due for pap, will schedule upcoming appt and return for fasting labs.   - Lipid Profile; Future  - Glucose; Future    FUTURE APPOINTMENTS:       - Follow-up visit as needed     Kita Cuellar NP  [facility]

## 2018-04-09 NOTE — PROGRESS NOTES
SUBJECTIVE:   Jayece Campos is a 32 year old female who presents to clinic today for the following health issues:  Chief Complaint   Patient presents with     Establish Care         She would like cholesterol level and glucose level checked.  She did have elevated cholesterol as a teenager, has not had it checked in quite some time.  She was not on mediation, but did control with diet and exercise.  She is a non-smoker.   She does have heart diease and diabetes in her family.        , one  at age 19.  Not sure when pap was done; consent obtained and [facility] Medical record is reviewed.    Pap was 2015 - no HPV was completed.  Pap was normal.      She is feeling well and does not have any new concerns today.       Problem list and histories reviewed & adjusted, as indicated.  Additional history: as documented    There is no problem list on file for this patient.    Past Surgical History:   Procedure Laterality Date     GYN SURGERY         Social History   Substance Use Topics     Smoking status: Never Smoker     Smokeless tobacco: Never Used     Alcohol use No     Family History   Problem Relation Age of Onset     Coronary Artery Disease Father      DIABETES Maternal Grandmother      Hypertension Maternal Grandmother      CEREBROVASCULAR DISEASE Maternal Grandmother      DIABETES Paternal Grandmother      Hypertension Paternal Grandmother      MENTAL ILLNESS Sister      Substance Abuse Sister          Current Outpatient Prescriptions   Medication Sig Dispense Refill     levonorgestrel-ethinyl estradiol (AVIANE,ALESSE,LESSINA) 0.1-20 MG-MCG per tablet Take 1 tablet by mouth daily       Not on File  Recent Labs   Lab Test  17   1110   ALT  22   CR  0.53   GFRESTIMATED  >90  Non  GFR Calc     GFRESTBLACK  >90   GFR Calc     POTASSIUM  4.0      BP Readings from Last 3 Encounters:   18 118/74   17 149/94   17 130/74    Wt Readings from Last 3  "Encounters:   04/09/18 222 lb 3.2 oz (100.8 kg)   10/02/16 230 lb (104.3 kg)   01/20/16 245 lb (111.1 kg)                Reviewed and updated as needed this visit by clinical staff  Tobacco  Allergies  Meds  Problems  Med Hx  Surg Hx  Fam Hx  Soc Hx        Reviewed and updated as needed this visit by Provider         ROS:  Constitutional, HEENT, cardiovascular, pulmonary, gi and gu systems are negative, except as otherwise noted.    OBJECTIVE:     /74 (BP Location: Left arm, Patient Position: Chair, Cuff Size: Adult Large)  Pulse 69  Temp 98.2  F (36.8  C) (Tympanic)  Resp 16  Ht 5' 5\" (1.651 m)  Wt 222 lb 3.2 oz (100.8 kg)  LMP 03/11/2018 (Approximate)  SpO2 98%  BMI 36.98 kg/m2  Body mass index is 36.98 kg/(m^2).  GENERAL: healthy, alert and no distress  EYES: Eyes grossly normal to inspection, PERRL and conjunctivae and sclerae normal  HENT: ear canals and TM's normal, nose and mouth without ulcers or lesions  NECK: no adenopathy, no asymmetry, masses, or scars and thyroid normal to palpation  RESP: lungs clear to auscultation - no rales, rhonchi or wheezes  CV: regular rate and rhythm, normal S1 S2, no S3 or S4, no murmur, click or rub, no peripheral edema and peripheral pulses strong  ABDOMEN: soft, nontender, no hepatosplenomegaly, no masses and bowel sounds normal  MS: no gross musculoskeletal defects noted, no edema  SKIN: no suspicious lesions or rashes  NEURO: Normal strength and tone, mentation intact and speech normal  PSYCH: mentation appears normal, affect normal/bright      ASSESSMENT/PLAN:       1. Encounter to establish care  Due for pap, will schedule upcoming appt and return for fasting labs.   - Lipid Profile; Future  - Glucose; Future    FUTURE APPOINTMENTS:       - Follow-up visit as needed     Kita Cuellar, NP  [facility]    "

## 2018-04-09 NOTE — NURSING NOTE
"Chief Complaint   Patient presents with     Establish Care       Initial /74 (BP Location: Left arm, Patient Position: Chair, Cuff Size: Adult Large)  Pulse 69  Temp 98.2  F (36.8  C) (Tympanic)  Resp 16  Ht 5' 5\" (1.651 m)  Wt 222 lb 3.2 oz (100.8 kg)  LMP 03/11/2018 (Approximate)  SpO2 98%  BMI 36.98 kg/m2 Estimated body mass index is 36.98 kg/(m^2) as calculated from the following:    Height as of this encounter: 5' 5\" (1.651 m).    Weight as of this encounter: 222 lb 3.2 oz (100.8 kg).  Medication Reconciliation: complete   Pamela M Lechevalier LPN      "

## 2018-04-09 NOTE — MR AVS SNAPSHOT
After Visit Summary   4/9/2018    Jaycee Campos    MRN: [id number]           Patient Information     Date Of Birth          1985        Visit Information        Provider Department      4/9/2018 9:00 AM Kita Cuellar NP [facility]        Today's Diagnoses     Encounter to establish care    -  1      Care Instructions        ASSESSMENT/PLAN:       1. Encounter to establish care  Due for pap, will schedule upcoming appt and return for fasting labs.   - Lipid Profile; Future  - Glucose; Future    FUTURE APPOINTMENTS:       - Follow-up visit as needed     Kita Cuellar NP  [facility]            Follow-ups after your visit        Follow-up notes from your care team     Return if symptoms worsen or fail to improve.      Your next 10 appointments already scheduled     Apr 25, 2018  9:00 AM CDT   (Arrive by 8:45 AM)   PHYSICAL with Kita Cuellar NP   [facility] ([facility] )    [address] [facility] [address]   [phone]              Future tests that were ordered for you today     Open Future Orders        Priority Expected Expires Ordered    Lipid Profile Routine 4/9/2018 5/9/2018 4/9/2018    Glucose Routine 4/9/2018 5/9/2018 4/9/2018            Who to contact     If you have questions or need follow up information about today's clinic visit or your schedule please contact [facility] directly at [phone].  Normal or non-critical lab and imaging results will be communicated to you by MyChart, letter or phone within 4 business days after the clinic has received the results. If you do not hear from us within 7 days, please contact the clinic through MyChart or phone. If you have a critical or abnormal lab result, we will notify you by phone as soon as possible.  Submit refill requests through [employer] or call your pharmacy and they will forward the refill request to  "us. Please allow 3 business days for your refill to be completed.          Additional Information About Your Visit        MyChart Information     "[employer]hart gives you secure access to your electronic health record. If you see a primary care provider, you can also send messages to your care team and make appointments. If you have questions, please call your primary care clinic.  If you do not have a primary care provider, please call [phone] and they will assist you.        Care EveryWhere ID     This is your Care EveryWhere ID. This could be used by other organizations to access your [place] medical records  [id number]        Your Vitals Were     Pulse Temperature Respirations Height Last Period Pulse Oximetry    69 98.2  F (36.8  C) (Tympanic) 16 5' 5\" (1.651 m) 03/11/2018 (Approximate) 98%    BMI (Body Mass Index)                   36.98 kg/m2            Blood Pressure from Last 3 Encounters:   04/09/18 118/74   07/20/17 149/94   05/07/17 130/74    Weight from Last 3 Encounters:   04/09/18 222 lb 3.2 oz (100.8 kg)   10/02/16 230 lb (104.3 kg)   01/20/16 245 lb (111.1 kg)               Primary Care Provider Office Phone # Fax #    Kita GARCIAHarish Cuellar [phone] [address] [facility] [address]        Equal Access to Services     [facility] AH: Hadii aad ku hadasho Soomaali, waaxda luqadaha, qaybta kaalmada adeegyada, jose antonio branch hayjennie dotson . So [facility] [phone].    ATENCIÓN: Si habla español, tiene a falcon disposición servicios gratuitos de asistencia lingüística. Llame al [phone].    We comply with applicable federal civil rights laws and [place] laws. We do not discriminate on the basis of race, color, national origin, age, disability, sex, sexual orientation, or gender identity.            Thank you!     Thank you for choosing [facility]  for your care. Our goal is always to provide you with excellent care. Hearing back " from our patients is one way we can continue to improve our services. Please take a few minutes to complete the written survey that you may receive in the mail after your visit with us. Thank you!             Your Updated Medication List - Protect others around you: Learn how to safely use, store and throw away your medicines at www.disposemymeds.org.          This list is accurate as of 4/9/18  9:31 AM.  Always use your most recent med list.                   Brand Name Dispense Instructions for use Diagnosis    levonorgestrel-ethinyl estradiol 0.1-20 MG-MCG per tablet    NELSON PETERSON LESSINA     Take 1 tablet by mouth daily

## 2018-04-10 ASSESSMENT — PATIENT HEALTH QUESTIONNAIRE - PHQ9: SUM OF ALL RESPONSES TO PHQ QUESTIONS 1-9: 0

## 2018-04-10 ASSESSMENT — ANXIETY QUESTIONNAIRES: GAD7 TOTAL SCORE: 0

## 2018-04-25 ENCOUNTER — OFFICE VISIT (OUTPATIENT)
Dept: FAMILY MEDICINE | Facility: OTHER | Age: 33
End: 2018-04-25
Attending: NURSE PRACTITIONER
Payer: COMMERCIAL

## 2018-04-25 VITALS
BODY MASS INDEX: 37.29 KG/M2 | WEIGHT: 223.8 LBS | HEART RATE: 60 BPM | SYSTOLIC BLOOD PRESSURE: 116 MMHG | TEMPERATURE: 98.1 F | OXYGEN SATURATION: 99 % | HEIGHT: 65 IN | RESPIRATION RATE: 16 BRPM | DIASTOLIC BLOOD PRESSURE: 72 MMHG

## 2018-04-25 DIAGNOSIS — M72.2 PLANTAR FASCIITIS: ICD-10-CM

## 2018-04-25 DIAGNOSIS — Z00.00 ROUTINE GENERAL MEDICAL EXAMINATION AT A HEALTH CARE FACILITY: Primary | ICD-10-CM

## 2018-04-25 DIAGNOSIS — Z12.4 CERVICAL CANCER SCREENING: ICD-10-CM

## 2018-04-25 DIAGNOSIS — Z76.89 ENCOUNTER TO ESTABLISH CARE: ICD-10-CM

## 2018-04-25 DIAGNOSIS — N89.8 VAGINAL DISCHARGE: ICD-10-CM

## 2018-04-25 LAB
CHOLEST SERPL-MCNC: 277 MG/DL
GLUCOSE SERPL-MCNC: 98 MG/DL (ref 70–99)
HDLC SERPL-MCNC: 59 MG/DL
LDLC SERPL CALC-MCNC: 184 MG/DL
NONHDLC SERPL-MCNC: 218 MG/DL
SPECIMEN SOURCE: NORMAL
TRIGL SERPL-MCNC: 171 MG/DL
WET PREP SPEC: NORMAL

## 2018-04-25 PROCEDURE — 80061 LIPID PANEL: CPT | Performed by: NURSE PRACTITIONER

## 2018-04-25 PROCEDURE — 82947 ASSAY GLUCOSE BLOOD QUANT: CPT | Performed by: NURSE PRACTITIONER

## 2018-04-25 PROCEDURE — 87210 SMEAR WET MOUNT SALINE/INK: CPT | Performed by: NURSE PRACTITIONER

## 2018-04-25 PROCEDURE — 88142 CYTOPATH C/V THIN LAYER: CPT | Performed by: NURSE PRACTITIONER

## 2018-04-25 PROCEDURE — 99000 SPECIMEN HANDLING OFFICE-LAB: CPT | Performed by: NURSE PRACTITIONER

## 2018-04-25 PROCEDURE — 36415 COLL VENOUS BLD VENIPUNCTURE: CPT | Performed by: NURSE PRACTITIONER

## 2018-04-25 PROCEDURE — 87624 HPV HI-RISK TYP POOLED RSLT: CPT | Mod: 90 | Performed by: NURSE PRACTITIONER

## 2018-04-25 PROCEDURE — 99395 PREV VISIT EST AGE 18-39: CPT | Performed by: NURSE PRACTITIONER

## 2018-04-25 ASSESSMENT — ANXIETY QUESTIONNAIRES
5. BEING SO RESTLESS THAT IT IS HARD TO SIT STILL: NOT AT ALL
IF YOU CHECKED OFF ANY PROBLEMS ON THIS QUESTIONNAIRE, HOW DIFFICULT HAVE THESE PROBLEMS MADE IT FOR YOU TO DO YOUR WORK, TAKE CARE OF THINGS AT HOME, OR GET ALONG WITH OTHER PEOPLE: NOT DIFFICULT AT ALL
7. FEELING AFRAID AS IF SOMETHING AWFUL MIGHT HAPPEN: NOT AT ALL
1. FEELING NERVOUS, ANXIOUS, OR ON EDGE: NOT AT ALL
6. BECOMING EASILY ANNOYED OR IRRITABLE: NOT AT ALL
4. TROUBLE RELAXING: NOT AT ALL
GAD7 TOTAL SCORE: 0
2. NOT BEING ABLE TO STOP OR CONTROL WORRYING: NOT AT ALL
3. WORRYING TOO MUCH ABOUT DIFFERENT THINGS: NOT AT ALL

## 2018-04-25 ASSESSMENT — PAIN SCALES - GENERAL: PAINLEVEL: SEVERE PAIN (6)

## 2018-04-25 NOTE — PATIENT INSTRUCTIONS
ASSESSMENT/PLAN:   1. Routine general medical examination at a health care facility  Exam completed     2. Cervical cancer screening  routine  - A pap thin layer screen with  HPV - recommended age 30 - 65 years (select HPV order below)  - HPV High Risk Types DNA Cervical    3. Vaginal discharge  - Wet prep    4. Plantar fasciitis  Ice, stretch and NSAIDS        Preventive Health Recommendations  Female Ages 26 - 39  Yearly exam:   See your health care provider every year in order to    Review health changes.     Discuss preventive care.      Review your medicines if you your doctor has prescribed any.    Until age 30: Get a Pap test every three years (more often if you have had an abnormal result).    After age 30: Talk to your doctor about whether you should have a Pap test every 3 years or have a Pap test with HPV screening every 5 years.   You do not need a Pap test if your uterus was removed (hysterectomy) and you have not had cancer.  You should be tested each year for STDs (sexually transmitted diseases), if you're at risk.   Talk to your provider about how often to have your cholesterol checked.  If you are at risk for diabetes, you should have a diabetes test (fasting glucose).  Shots: Get a flu shot each year. Get a tetanus shot every 10 years.   Nutrition:     Eat at least 5 servings of fruits and vegetables each day.    Eat whole-grain bread, whole-wheat pasta and brown rice instead of white grains and rice.    Talk to your provider about Calcium and Vitamin D.     Lifestyle    Exercise at least 150 minutes a week (30 minutes a day, 5 days of the week). This will help you control your weight and prevent disease.    Limit alcohol to one drink per day.    No smoking.     Wear sunscreen to prevent skin cancer.    See your dentist every six months for an exam and cleaning.    Plantar Fasciitis  Plantar fasciitis is a painful swelling of the plantar fascia. The plantar fascia is a thick, fibrous layer of  tissue. It covers the bones on the bottom of your foot. And it supports the foot bones in an arched position.  This can happen gradually or suddenly. It usually affects one foot at a time. Heel pain can be sharp, like a knife sticking into the bottom of your foot. You may feel pain after exercising, long-distance jogging, stair climbing, long periods of standing, or after standing up.  Risk factors include: non-active lifestyle, arthritis, diabetes, obesity or recent weight gain, flat foot, high arch. Wearing high heels, loose shoes, or shoes with poor arch support for long periods of time adds to the risk. This problem is commonly found in runners and dancers. It also found in people who stand on hard surfaces for long periods of time.  Foot pain from this condition is usually worse in the morning. But it improves with walking. By the end of the day there may be a dull aching. Treatment requires short-term rest and controlling swelling. It may take up to 9 months before all symptoms go away. Rarely, a steroid injection into the foot, or surgery, may be needed.  Home care    If you are overweight, lose weight to help healing.    Choose supportive shoes with good arch support and shock absorbency. Replace athletic shoes when they become worn out. Don t walk or run barefoot.    Premade or custom-fitted shoe inserts may be helpful. Inserts made of silicone seem to be the most effective. Custom-made inserts can be provided by a podiatrist or foot specialist, physical therapist, or orthopedist.    Premade or custom-made night splints keep the heel stretched out while you sleep. They may prevent morning pain.    Avoid activities that stress the feet: jogging, prolonged standing or walking, contact sports, etc.    First thing in the morning and before sports, stretch the bottom of your feet. Gently flex your ankle so the toes move toward your knee.    Icing may help control heel pain. Apply an ice pack to the heel for  10-20 minutes as a preventive. Or ice your heel after a severe flare-up of symptoms. You may repeat this every 1-2 hours as needed.    You may use over-the-counter pain medicine to control pain, unless another medicine was prescribed. Anti-inflammatory pain medicines, such as ibuprofen or naproxen, may work better than acetaminophen. If you have chronic liver or kidney disease or ever had a stomach ulcer or GI bleeding, talk with your healthcare provider before using these medicines.  Follow-up care  Follow up with your healthcare provider, physical therapist, or podiatrist or foot specialist as advised.  Call for an appointment if pain worsens or there is no relief after a few weeks of home treatment. Shoe inserts, a night splint, or a special boot may be required.  If X-rays were taken, you will be told of any new findings that may affect your care.  When to seek medical advice  Call your healthcare provider right away if any of these occur:    Foot swelling    Redness with increasing pain  Date Last Reviewed: 11/21/2015 2000-2017 [employer]. [address] [place] [address]. All rights reserved. This information is not intended as a substitute for professional medical care. Always follow your healthcare professional's instructions.        Treating Plantar Fasciitis  First, your healthcare provider tries to find out the cause of your problem. He or she can then suggest ways to ease pain. If your pain is due to poor foot mechanics, occasionally custom-made shoe inserts (orthoses) may help.    Ease symptoms    To relieve mild symptoms, try aspirin, ibuprofen, or other medicines as directed. Rubbing ice on the area may also help.    To lessen severe pain and swelling, your healthcare provider may give you pills or injections. In some cases, you may need a walking cast. Physical therapy, such as ultrasound or a daily stretching program, may also be recommended. Surgery is rarely  needed.    To ease symptoms caused by poor foot mechanics, your foot may be taped. This supports the arch and temporarily controls movement. Night splints may also help by stretching the fascia.  Control movement  If taping helps, your healthcare provider may prescribe orthoses. These are inserts built from plaster casts of your feet. They control the way your foot moves. As a result, your symptoms may go away.  Reduce overuse  Every time your foot strikes the ground, the plantar fascia is stretched. You can lessen the strain on the plantar fascia and the chance of overuse by:    Losing any excess weight    Not running on hard or uneven ground    Using orthoses, if recommended, in your shoes and house slippers  If surgery is needed  Your healthcare provider may consider surgery if other types of treatment don't control your pain. During surgery, the plantar fascia is partially cut to release tension. As you heal, fibrous tissue fills the space between the heel bone and the plantar fascia.   Date Last Reviewed: 1/1/2018 2000-2017 [employer]. [address] [place] [address], [place] [address]. All rights reserved. This information is not intended as a substitute for professional medical care. Always follow your healthcare professional's instructions.

## 2018-04-25 NOTE — PROGRESS NOTES
SUBJECTIVE:   CC: Jaycee Campos is an 32 year old woman who presents for preventive health visit.     Healthy Habits:    Do you get at least three servings of calcium containing foods daily (dairy, green leafy vegetables, etc.)? No     Amount of exercise or daily activities, outside of work: 3-4 day(s) per week    Problems taking medications regularly No    Medication side effects: No    Have you had an eye exam in the past two years? no    Do you see a dentist twice per year? yes    Do you have sleep apnea, excessive snoring or daytime drowsiness?no      Right heel pain:  Onset of symptoms: January  Location of symptoms:  Right heel  Timing of symptoms: intermittent  Duration: hours  Cause/Injury:  Started working out in January on treadmill  Quality of symptoms: sharp to aching  Associated symptoms: none  Severity of symptoms:    5-10/10     Radiation: into ball of foot  Aggravating factors:  Worse in the morning when rising for the day, gets better with walking and worse after sitting for a long period of time.   Alleviating factors:  Stretching.  Has not tried ice or NSAIDS.          Today's PHQ-2 Score: No flowsheet data found.    PHQ-9 SCORE 4/9/2018 4/25/2018   Total Score 0 0     NICOLE-7 SCORE 4/9/2018 4/25/2018   Total Score 0 0         Abuse: Current or Past(Physical, Sexual or Emotional)- No  Do you feel safe in your environment - Yes    Social History   Substance Use Topics     Smoking status: Never Smoker     Smokeless tobacco: Never Used     Alcohol use No     If you drink alcohol do you typically have >3 drinks per day or >7 drinks per week? Denies use                     Reviewed orders with patient.  Reviewed health maintenance and updated orders accordingly - Yes  BP Readings from Last 3 Encounters:   04/25/18 116/72   04/09/18 118/74   07/20/17 149/94    Wt Readings from Last 3 Encounters:   04/25/18 223 lb 12.8 oz (101.5 kg)   04/09/18 222 lb 3.2 oz (100.8 kg)   10/02/16 230 lb (104.3 kg)                   There is no problem list on file for this patient.    Past Surgical History:   Procedure Laterality Date     GYN SURGERY         Social History   Substance Use Topics     Smoking status: Never Smoker     Smokeless tobacco: Never Used     Alcohol use No     Family History   Problem Relation Age of Onset     Coronary Artery Disease Father      DIABETES Maternal Grandmother      Hypertension Maternal Grandmother      CEREBROVASCULAR DISEASE Maternal Grandmother      DIABETES Paternal Grandmother      Hypertension Paternal Grandmother      MENTAL ILLNESS Sister      Substance Abuse Sister          Current Outpatient Prescriptions   Medication Sig Dispense Refill     levonorgestrel-ethinyl estradiol (AVIANE,ALESSE,LESSINA) 0.1-20 MG-MCG per tablet Take 1 tablet by mouth daily       Not on File  Recent Labs   Lab Test  05/07/17   1110   ALT  22   CR  0.53   GFRESTIMATED  >90  Non  GFR Calc     GFRESTBLACK  >90   GFR Calc     POTASSIUM  4.0        Mammogram not appropriate for this patient based on age.    Pertinent mammograms are reviewed under the imaging tab.  History of abnormal Pap smear: all have been normal, no HPV    Reviewed and updated as needed this visit by clinical staff  Tobacco  Allergies  Meds  Problems         Reviewed and updated as needed this visit by Provider          ROS:  CONSTITUTIONAL: NEGATIVE for fever, chills, change in weight  INTEGUMENTARU/SKIN: NEGATIVE for worrisome rashes, moles or lesions  EYES: NEGATIVE for vision changes or irritation  ENT: NEGATIVE for ear, mouth and throat problems  RESP: NEGATIVE for significant cough or SOB  BREAST: NEGATIVE for masses, tenderness or discharge  CV: NEGATIVE for chest pain, palpitations or peripheral edema  GI: NEGATIVE for nausea, abdominal pain, heartburn, or change in bowel habits  : NEGATIVE for unusual urinary or vaginal symptoms. Periods are regular.  MUSCULOSKELETAL:as above  NEURO: NEGATIVE  "for weakness, dizziness or paresthesias  PSYCHIATRIC: NEGATIVE for changes in mood or affect    OBJECTIVE:   /72 (BP Location: Left arm, Patient Position: Chair, Cuff Size: Adult Large)  Pulse 60  Temp 98.1  F (36.7  C) (Tympanic)  Resp 16  Ht 5' 5\" (1.651 m)  Wt 223 lb 12.8 oz (101.5 kg)  LMP 04/11/2018 (Approximate)  SpO2 99%  BMI 37.24 kg/m2  EXAM:  GENERAL: healthy, alert and no distress  EYES: Eyes grossly normal to inspection, PERRL and conjunctivae and sclerae normal  HENT: ear canals and TM's normal, nose and mouth without ulcers or lesions  NECK: no adenopathy, no asymmetry, masses, or scars and thyroid normal to palpation  RESP: lungs clear to auscultation - no rales, rhonchi or wheezes  CV: regular rate and rhythm, normal S1 S2, no S3 or S4, no murmur, click or rub, no peripheral edema and peripheral pulses strong  ABDOMEN: soft, nontender, no hepatosplenomegaly, no masses and bowel sounds normal   (female): normal female external genitalia, normal urethral meatus, vaginal mucosa, normal cervix/adnexa/uterus without masses or discharge, pap obtained.   MS: no gross musculoskeletal defects noted, no edema  SKIN: no suspicious lesions or rashes  NEURO: Normal strength and tone, mentation intact and speech normal  PSYCH: mentation appears normal, affect normal/bright    ASSESSMENT/PLAN:   1. Routine general medical examination at a health care facility  Exam completed     2. Cervical cancer screening  routine  - A pap thin layer screen with  HPV - recommended age 30 - 65 years (select HPV order below)  - HPV High Risk Types DNA Cervical    3. Vaginal discharge  - Wet prep    4. Plantar fasciitis  Ice, stretch and NSAIDS      COUNSELING:   Reviewed preventive health counseling, as reflected in patient instructions       Regular exercise       Healthy diet/nutrition       Vision screening       Hearing screening       Immunizations           reports that she has never smoked. She has never used " "smokeless tobacco.    Estimated body mass index is 37.24 kg/(m^2) as calculated from the following:    Height as of this encounter: 5' 5\" (1.651 m).    Weight as of this encounter: 223 lb 12.8 oz (101.5 kg).   Weight loss, regular exercise discussed.     Counseling Resources:  ATP IV Guidelines  Pooled Cohorts Equation Calculator  Breast Cancer Risk Calculator  FRAX Risk Assessment  ICSI Preventive Guidelines  Dietary Guidelines for Americans, 2010  USDA's MyPlate  ASA Prophylaxis  Lung CA Screening    Kita Cuellar, NP  [facility]  "

## 2018-04-25 NOTE — MR AVS SNAPSHOT
After Visit Summary   4/25/2018    Jaycee Campos    MRN: [id number]           Patient Information     Date Of Birth          1985        Visit Information        Provider Department      4/25/2018 9:00 AM Kita Cuellar NP [facility]        Today's Diagnoses     Routine general medical examination at a health care facility    -  1    Cervical cancer screening        Vaginal discharge        Plantar fasciitis          Care Instructions      ASSESSMENT/PLAN:   1. Routine general medical examination at a health care facility  Exam completed     2. Cervical cancer screening  routine  - A pap thin layer screen with  HPV - recommended age 30 - 65 years (select HPV order below)  - HPV High Risk Types DNA Cervical    3. Vaginal discharge  - Wet prep    4. Plantar fasciitis  Ice, stretch and NSAIDS        Preventive Health Recommendations  Female Ages 26 - 39  Yearly exam:   See your health care provider every year in order to    Review health changes.     Discuss preventive care.      Review your medicines if you your doctor has prescribed any.    Until age 30: Get a Pap test every three years (more often if you have had an abnormal result).    After age 30: Talk to your doctor about whether you should have a Pap test every 3 years or have a Pap test with HPV screening every 5 years.   You do not need a Pap test if your uterus was removed (hysterectomy) and you have not had cancer.  You should be tested each year for STDs (sexually transmitted diseases), if you're at risk.   Talk to your provider about how often to have your cholesterol checked.  If you are at risk for diabetes, you should have a diabetes test (fasting glucose).  Shots: Get a flu shot each year. Get a tetanus shot every 10 years.   Nutrition:     Eat at least 5 servings of fruits and vegetables each day.    Eat whole-grain bread, whole-wheat pasta and brown rice instead of white grains and rice.    Talk to your  provider about Calcium and Vitamin D.     Lifestyle    Exercise at least 150 minutes a week (30 minutes a day, 5 days of the week). This will help you control your weight and prevent disease.    Limit alcohol to one drink per day.    No smoking.     Wear sunscreen to prevent skin cancer.    See your dentist every six months for an exam and cleaning.    Plantar Fasciitis  Plantar fasciitis is a painful swelling of the plantar fascia. The plantar fascia is a thick, fibrous layer of tissue. It covers the bones on the bottom of your foot. And it supports the foot bones in an arched position.  This can happen gradually or suddenly. It usually affects one foot at a time. Heel pain can be sharp, like a knife sticking into the bottom of your foot. You may feel pain after exercising, long-distance jogging, stair climbing, long periods of standing, or after standing up.  Risk factors include: non-active lifestyle, arthritis, diabetes, obesity or recent weight gain, flat foot, high arch. Wearing high heels, loose shoes, or shoes with poor arch support for long periods of time adds to the risk. This problem is commonly found in runners and dancers. It also found in people who stand on hard surfaces for long periods of time.  Foot pain from this condition is usually worse in the morning. But it improves with walking. By the end of the day there may be a dull aching. Treatment requires short-term rest and controlling swelling. It may take up to 9 months before all symptoms go away. Rarely, a steroid injection into the foot, or surgery, may be needed.  Home care    If you are overweight, lose weight to help healing.    Choose supportive shoes with good arch support and shock absorbency. Replace athletic shoes when they become worn out. Don t walk or run barefoot.    Premade or custom-fitted shoe inserts may be helpful. Inserts made of silicone seem to be the most effective. Custom-made inserts can be provided by a podiatrist or  foot specialist, physical therapist, or orthopedist.    Premade or custom-made night splints keep the heel stretched out while you sleep. They may prevent morning pain.    Avoid activities that stress the feet: jogging, prolonged standing or walking, contact sports, etc.    First thing in the morning and before sports, stretch the bottom of your feet. Gently flex your ankle so the toes move toward your knee.    Icing may help control heel pain. Apply an ice pack to the heel for 10-20 minutes as a preventive. Or ice your heel after a severe flare-up of symptoms. You may repeat this every 1-2 hours as needed.    You may use over-the-counter pain medicine to control pain, unless another medicine was prescribed. Anti-inflammatory pain medicines, such as ibuprofen or naproxen, may work better than acetaminophen. If you have chronic liver or kidney disease or ever had a stomach ulcer or GI bleeding, talk with your healthcare provider before using these medicines.  Follow-up care  Follow up with your healthcare provider, physical therapist, or podiatrist or foot specialist as advised.  Call for an appointment if pain worsens or there is no relief after a few weeks of home treatment. Shoe inserts, a night splint, or a special boot may be required.  If X-rays were taken, you will be told of any new findings that may affect your care.  When to seek medical advice  Call your healthcare provider right away if any of these occur:    Foot swelling    Redness with increasing pain  Date Last Reviewed: 11/21/2015 2000-2017 [employer]. [address] [place] [address], [place] [address]. All rights reserved. This information is not intended as a substitute for professional medical care. Always follow your healthcare professional's instructions.        Treating Plantar Fasciitis  First, your healthcare provider tries to find out the cause of your problem. He or she can then suggest ways to ease pain. If your pain is due to  poor foot mechanics, occasionally custom-made shoe inserts (orthoses) may help.    Ease symptoms    To relieve mild symptoms, try aspirin, ibuprofen, or other medicines as directed. Rubbing ice on the area may also help.    To lessen severe pain and swelling, your healthcare provider may give you pills or injections. In some cases, you may need a walking cast. Physical therapy, such as ultrasound or a daily stretching program, may also be recommended. Surgery is rarely needed.    To ease symptoms caused by poor foot mechanics, your foot may be taped. This supports the arch and temporarily controls movement. Night splints may also help by stretching the fascia.  Control movement  If taping helps, your healthcare provider may prescribe orthoses. These are inserts built from plaster casts of your feet. They control the way your foot moves. As a result, your symptoms may go away.  Reduce overuse  Every time your foot strikes the ground, the plantar fascia is stretched. You can lessen the strain on the plantar fascia and the chance of overuse by:    Losing any excess weight    Not running on hard or uneven ground    Using orthoses, if recommended, in your shoes and house slippers  If surgery is needed  Your healthcare provider may consider surgery if other types of treatment don't control your pain. During surgery, the plantar fascia is partially cut to release tension. As you heal, fibrous tissue fills the space between the heel bone and the plantar fascia.   Date Last Reviewed: 1/1/2018 2000-2017 [employer]. [address] [place] [address], [place] [address]. All rights reserved. This information is not intended as a substitute for professional medical care. Always follow your healthcare professional's instructions.                Follow-ups after your visit        Who to contact     If you have questions or need follow up information about today's clinic visit or your schedule please contact [facility]  "[facility] directly at [phone].  Normal or non-critical lab and imaging results will be communicated to you by MyChart, letter or phone within 4 business days after the clinic has received the results. If you do not hear from us within 7 days, please contact the clinic through [email or web address] or phone. If you have a critical or abnormal lab result, we will notify you by phone as soon as possible.  Submit refill requests through [employer] or call your pharmacy and they will forward the refill request to us. Please allow 3 business days for your refill to be completed.          Additional Information About Your Visit        [email or web address] Information     [employer] gives you secure access to your electronic health record. If you see a primary care provider, you can also send messages to your care team and make appointments. If you have questions, please call your primary care clinic.  If you do not have a primary care provider, please call [phone] and they will assist you.        Care EveryWhere ID     This is your Care EveryWhere ID. This could be used by other organizations to access your [facility] medical records  [id number]        Your Vitals Were     Pulse Temperature Respirations Height Last Period Pulse Oximetry    60 98.1  F (36.7  C) (Tympanic) 16 5' 5\" (1.651 m) 04/11/2018 (Approximate) 99%    BMI (Body Mass Index)                   37.24 kg/m2            Blood Pressure from Last 3 Encounters:   04/25/18 116/72   04/09/18 118/74   07/20/17 149/94    Weight from Last 3 Encounters:   04/25/18 223 lb 12.8 oz (101.5 kg)   04/09/18 222 lb 3.2 oz (100.8 kg)   10/02/16 230 lb (104.3 kg)              We Performed the Following     A pap thin layer screen with  HPV - recommended age 30 - 65 years (select HPV order below)     HPV High Risk Types DNA Cervical     Wet prep        Primary Care Provider Office Phone # Fax #    Kita Cuellar [phone] [facility] " [phone]        Equal Access to Services     [facility]: Hadii aad ku hadclaudiamontana Dexterali, wakeithda luqhans, qaybta simonachenjose antonio wolf. So [facility] [phone].    ATENCIÓN: Si habla español, tiene a falcon disposición servicios gratuitos de asistencia lingüística. Llame al [phone].    We comply with applicable federal civil rights laws and [place] laws. We do not discriminate on the basis of race, color, national origin, age, disability, sex, sexual orientation, or gender identity.            Thank you!     Thank you for choosing [facility]  for your care. Our goal is always to provide you with excellent care. Hearing back from our patients is one way we can continue to improve our services. Please take a few minutes to complete the written survey that you may receive in the mail after your visit with us. Thank you!             Your Updated Medication List - Protect others around you: Learn how to safely use, store and throw away your medicines at www.disposemymeds.org.          This list is accurate as of 4/25/18 10:02 AM.  Always use your most recent med list.                   Brand Name Dispense Instructions for use Diagnosis    levonorgestrel-ethinyl estradiol 0.1-20 MG-MCG per tablet    NELSON PETERSON LESSINA     Take 1 tablet by mouth daily

## 2018-04-25 NOTE — NURSING NOTE
"Chief Complaint   Patient presents with     Physical       Initial /72 (BP Location: Left arm, Patient Position: Chair, Cuff Size: Adult Large)  Pulse 60  Temp 98.1  F (36.7  C) (Tympanic)  Resp 16  Ht 5' 5\" (1.651 m)  Wt 223 lb 12.8 oz (101.5 kg)  LMP 04/11/2018 (Approximate)  SpO2 99%  BMI 37.24 kg/m2 Estimated body mass index is 37.24 kg/(m^2) as calculated from the following:    Height as of this encounter: 5' 5\" (1.651 m).    Weight as of this encounter: 223 lb 12.8 oz (101.5 kg).  Medication Reconciliation: complete   Pamela M Lechevalier LPN      "

## 2018-04-26 ASSESSMENT — ANXIETY QUESTIONNAIRES: GAD7 TOTAL SCORE: 0

## 2018-04-26 ASSESSMENT — PATIENT HEALTH QUESTIONNAIRE - PHQ9: SUM OF ALL RESPONSES TO PHQ QUESTIONS 1-9: 0

## 2018-05-01 LAB
COPATH REPORT: NORMAL
PAP: NORMAL

## 2018-05-03 LAB
FINAL DIAGNOSIS: NORMAL
HPV HR 12 DNA CVX QL NAA+PROBE: NEGATIVE
HPV16 DNA SPEC QL NAA+PROBE: NEGATIVE
HPV18 DNA SPEC QL NAA+PROBE: NEGATIVE
SPECIMEN DESCRIPTION: NORMAL
SPECIMEN SOURCE CVX/VAG CYTO: NORMAL

## 2019-02-01 ENCOUNTER — TRANSFERRED RECORDS (OUTPATIENT)
Dept: HEALTH INFORMATION MANAGEMENT | Facility: HOSPITAL | Age: 34
End: 2019-02-01

## 2019-02-01 LAB
HPV ABSTRACT: NORMAL
PAP-ABSTRACT: NORMAL

## 2019-05-02 NOTE — PATIENT INSTRUCTIONS
ASSESSMENT/PLAN:   1. Annual physical exam      2. Daytime sleepiness  If labs are normal, will order a sleep study.   - TSH with free T4 reflex  - Vitamin D Deficiency  - Basic metabolic panel  - CBC with platelets    3. CARDIOVASCULAR SCREENING; LDL GOAL LESS THAN 100  - Lipid Profile    Preventive Health Recommendations  Female Ages 26 - 39  Yearly exam:   See your health care provider every year in order to    Review health changes.     Discuss preventive care.      Review your medicines if you your doctor has prescribed any.    Until age 30: Get a Pap test every three years (more often if you have had an abnormal result).    After age 30: Talk to your doctor about whether you should have a Pap test every 3 years or have a Pap test with HPV screening every 5 years.   You do not need a Pap test if your uterus was removed (hysterectomy) and you have not had cancer.  You should be tested each year for STDs (sexually transmitted diseases), if you're at risk.   Talk to your provider about how often to have your cholesterol checked.  If you are at risk for diabetes, you should have a diabetes test (fasting glucose).  Shots: Get a flu shot each year. Get a tetanus shot every 10 years.   Nutrition:     Eat at least 5 servings of fruits and vegetables each day.    Eat whole-grain bread, whole-wheat pasta and brown rice instead of white grains and rice.    Get adequate Calcium and Vitamin D.     Lifestyle    Exercise at least 150 minutes a week (30 minutes a day, 5 days of the week). This will help you control your weight and prevent disease.    Limit alcohol to one drink per day.    No smoking.     Wear sunscreen to prevent skin cancer.    See your dentist every six months for an exam and cleaning.    Preventive Health Recommendations  Female Ages 26 - 39  Yearly exam:   See your health care provider every year in order to    Review health changes.     Discuss preventive care.      Review your medicines if you your  doctor has prescribed any.    Until age 30: Get a Pap test every three years (more often if you have had an abnormal result).    After age 30: Talk to your doctor about whether you should have a Pap test every 3 years or have a Pap test with HPV screening every 5 years.   You do not need a Pap test if your uterus was removed (hysterectomy) and you have not had cancer.  You should be tested each year for STDs (sexually transmitted diseases), if you're at risk.   Talk to your provider about how often to have your cholesterol checked.  If you are at risk for diabetes, you should have a diabetes test (fasting glucose).  Shots: Get a flu shot each year. Get a tetanus shot every 10 years.   Nutrition:     Eat at least 5 servings of fruits and vegetables each day.    Eat whole-grain bread, whole-wheat pasta and brown rice instead of white grains and rice.    Get adequate Calcium and Vitamin D.     Lifestyle    Exercise at least 150 minutes a week (30 minutes a day, 5 days of the week). This will help you control your weight and prevent disease.    Limit alcohol to one drink per day.    No smoking.     Wear sunscreen to prevent skin cancer.    See your dentist every six months for an exam and cleaning.

## 2019-05-02 NOTE — PROGRESS NOTES
SUBJECTIVE:   CC: Jaycee Campos is an 33 year old woman who presents for preventive health visit.     Healthy Habits:    Do you get at least three servings of calcium containing foods daily (dairy, green leafy vegetables, etc.)? Not always does take vitamin d supplement     Amount of exercise or daily activities, outside of work: 3 day(s) per week    Problems taking medications regularly No    Medication side effects: No    Have you had an eye exam in the past two years? no    Do you see a dentist twice per year? yes    Do you have sleep apnea, excessive snoring or daytime drowsiness?no    Concern - sleep issues   Onset: last couple months     Description:   Feels really tired wants to sleep all the time.  She is able to fall asleep at night and stay asleep - wakes feeling rested but tired as the day goes on.   says she does snore, does not think stops breathing.      Intensity: moderate    Progression of Symptoms:  worsening    Accompanying Signs & Symptoms:  Around afternoon time feels like needs a nap    Previous history of similar problem:   no    Precipitating factors:   Worsened by: unknown if from new bc or if needs thyroid checked     Alleviating factors:  Improved by: unknown     Therapies Tried and outcome: nothing     Today's PHQ-2 Score:   PHQ-2 ( 1999 Pfizer) 5/6/2019   Q1: Little interest or pleasure in doing things 0   Q2: Feeling down, depressed or hopeless 0   PHQ-2 Score 0     Denies anxiety or depression.     Abuse: Current or Past(Physical, Sexual or Emotional)- No  Do you feel safe in your environment? Yes    Social History     Tobacco Use     Smoking status: Never Smoker     Smokeless tobacco: Never Used   Substance Use Topics     Alcohol use: No     Alcohol/week: 0.0 oz     If you drink alcohol do you typically have >3 drinks per day or >7 drinks per week? rare                     Reviewed orders with patient.  Reviewed health maintenance and updated orders accordingly - Yes  BP  Readings from Last 3 Encounters:   05/06/19 130/74   04/25/18 116/72   04/09/18 118/74    Wt Readings from Last 3 Encounters:   05/06/19 99.6 kg (219 lb 8 oz)   04/25/18 101.5 kg (223 lb 12.8 oz)   04/09/18 100.8 kg (222 lb 3.2 oz)                  There is no problem list on file for this patient.    Past Surgical History:   Procedure Laterality Date     GYN SURGERY         Social History     Tobacco Use     Smoking status: Never Smoker     Smokeless tobacco: Never Used   Substance Use Topics     Alcohol use: No     Alcohol/week: 0.0 oz     Family History   Problem Relation Age of Onset     Coronary Artery Disease Father      Diabetes Maternal Grandmother      Hypertension Maternal Grandmother      Cerebrovascular Disease Maternal Grandmother      Diabetes Paternal Grandmother      Hypertension Paternal Grandmother      Mental Illness Sister      Substance Abuse Sister          Current Outpatient Medications   Medication Sig Dispense Refill     JUNEL 1.5/30 1.5-30 MG-MCG tablet TK 1 T PO QD  2     No Known Allergies  Recent Labs   Lab Test 04/25/18  0833 05/07/17  1110   *  --    HDL 59  --    TRIG 171*  --    ALT  --  22   CR  --  0.53   GFRESTIMATED  --  >90  Non  GFR Calc     GFRESTBLACK  --  >90   GFR Calc     POTASSIUM  --  4.0        Mammogram not appropriate for this patient based on age.    Had pap and breast exam in February at OB/GYN.    Pertinent mammograms are reviewed under the imaging tab.  History of abnormal Pap smear:   PAP / HPV Latest Ref Rng & Units 4/25/2018   PAP - NIL   HPV 16 DNA NEG:Negative Negative   HPV 18 DNA NEG:Negative Negative   OTHER HR HPV NEG:Negative Negative     Reviewed and updated as needed this visit by clinical staff  Tobacco  Allergies  Meds  Problems  Med Hx  Surg Hx  Fam Hx         Reviewed and updated as needed this visit by Provider         ROS:  CONSTITUTIONAL: NEGATIVE for fever, chills, change in  "weight  INTEGUMENTARU/SKIN: NEGATIVE for worrisome rashes, moles or lesions  EYES: NEGATIVE for vision changes or irritation  ENT: NEGATIVE for ear, mouth and throat problems  RESP: NEGATIVE for significant cough or SOB  BREAST: NEGATIVE for masses, tenderness or discharge  CV: NEGATIVE for chest pain, palpitations or peripheral edema  GI: NEGATIVE for nausea, abdominal pain, heartburn, or change in bowel habits  : NEGATIVE for unusual urinary or vaginal symptoms. Periods are regular.  MUSCULOSKELETAL: NEGATIVE for significant arthralgias or myalgia  NEURO: NEGATIVE for weakness, dizziness or paresthesias  PSYCHIATRIC: NEGATIVE for changes in mood or affect    OBJECTIVE:   /74 (BP Location: Left arm, Patient Position: Chair, Cuff Size: Adult Large)   Pulse 60   Temp 98.1  F (36.7  C) (Tympanic)   Resp 16   Ht 1.626 m (5' 4\")   Wt 99.6 kg (219 lb 8 oz)   LMP 04/17/2019 (Approximate)   SpO2 99%   BMI 37.68 kg/m    EXAM:  GENERAL: healthy, alert and no distress  EYES: Eyes grossly normal to inspection, PERRL and conjunctivae and sclerae normal  HENT: ear canals and TM's normal, nose and mouth without ulcers or lesions  NECK: no adenopathy, no asymmetry, masses, or scars and thyroid normal to palpation  RESP: lungs clear to auscultation - no rales, rhonchi or wheezes  CV: regular rate and rhythm, normal S1 S2, no S3 or S4, no murmur, click or rub, no peripheral edema and peripheral pulses strong  ABDOMEN: soft, nontender, no hepatosplenomegaly, no masses and bowel sounds normal  MS: no gross musculoskeletal defects noted, no edema  SKIN: no suspicious lesions or rashes  NEURO: Normal strength and tone, mentation intact and speech normal  PSYCH: mentation appears normal, affect normal/bright      ASSESSMENT/PLAN:   1. Annual physical exam    2. Daytime sleepiness  If labs are normal, will order a sleep study.   - TSH with free T4 reflex  - Vitamin D Deficiency  - Basic metabolic panel  - CBC with " "platelets    3. CARDIOVASCULAR SCREENING; LDL GOAL LESS THAN 100  - Lipid Profile    COUNSELING:   Reviewed preventive health counseling, as reflected in patient instructions       Regular exercise       Healthy diet/nutrition       Vision screening       Hearing screening       Immunizations          BP Readings from Last 1 Encounters:   05/06/19 130/74     Estimated body mass index is 37.68 kg/m  as calculated from the following:    Height as of this encounter: 1.626 m (5' 4\").    Weight as of this encounter: 99.6 kg (219 lb 8 oz).      Weight management plan: Discussed healthy diet and exercise guidelines     reports that she has never smoked. She has never used smokeless tobacco.      Counseling Resources:  ATP IV Guidelines  Pooled Cohorts Equation Calculator  Breast Cancer Risk Calculator  FRAX Risk Assessment  ICSI Preventive Guidelines  Dietary Guidelines for Americans, 2010  USDA's MyPlate  ASA Prophylaxis  Lung CA Screening    Kita Cuellar NP  [facility] [place] [facility]    "

## 2019-05-06 ENCOUNTER — OFFICE VISIT (OUTPATIENT)
Dept: FAMILY MEDICINE | Facility: OTHER | Age: 34
End: 2019-05-06
Attending: NURSE PRACTITIONER
Payer: COMMERCIAL

## 2019-05-06 VITALS
TEMPERATURE: 98.1 F | WEIGHT: 219.5 LBS | DIASTOLIC BLOOD PRESSURE: 74 MMHG | HEIGHT: 64 IN | RESPIRATION RATE: 16 BRPM | HEART RATE: 60 BPM | SYSTOLIC BLOOD PRESSURE: 130 MMHG | OXYGEN SATURATION: 99 % | BODY MASS INDEX: 37.47 KG/M2

## 2019-05-06 DIAGNOSIS — Z00.00 ANNUAL PHYSICAL EXAM: Primary | ICD-10-CM

## 2019-05-06 DIAGNOSIS — R40.0 DAYTIME SLEEPINESS: ICD-10-CM

## 2019-05-06 DIAGNOSIS — Z13.6 CARDIOVASCULAR SCREENING; LDL GOAL LESS THAN 100: ICD-10-CM

## 2019-05-06 LAB
ANION GAP SERPL CALCULATED.3IONS-SCNC: 6 MMOL/L (ref 3–14)
BUN SERPL-MCNC: 14 MG/DL (ref 7–30)
CALCIUM SERPL-MCNC: 9.5 MG/DL (ref 8.5–10.1)
CHLORIDE SERPL-SCNC: 108 MMOL/L (ref 94–109)
CHOLEST SERPL-MCNC: 286 MG/DL
CO2 SERPL-SCNC: 25 MMOL/L (ref 20–32)
CREAT SERPL-MCNC: 0.65 MG/DL (ref 0.52–1.04)
ERYTHROCYTE [DISTWIDTH] IN BLOOD BY AUTOMATED COUNT: 15.1 % (ref 10–15)
GFR SERPL CREATININE-BSD FRML MDRD: >90 ML/MIN/{1.73_M2}
GLUCOSE SERPL-MCNC: 89 MG/DL (ref 70–99)
HCT VFR BLD AUTO: 39.7 % (ref 35–47)
HDLC SERPL-MCNC: 62 MG/DL
HGB BLD-MCNC: 12.9 G/DL (ref 11.7–15.7)
LDLC SERPL CALC-MCNC: 202 MG/DL
MCH RBC QN AUTO: 27.7 PG (ref 26.5–33)
MCHC RBC AUTO-ENTMCNC: 32.5 G/DL (ref 31.5–36.5)
MCV RBC AUTO: 85 FL (ref 78–100)
NONHDLC SERPL-MCNC: 224 MG/DL
PLATELET # BLD AUTO: 391 10E9/L (ref 150–450)
POTASSIUM SERPL-SCNC: 4.1 MMOL/L (ref 3.4–5.3)
RBC # BLD AUTO: 4.66 10E12/L (ref 3.8–5.2)
SODIUM SERPL-SCNC: 139 MMOL/L (ref 133–144)
TRIGL SERPL-MCNC: 111 MG/DL
TSH SERPL DL<=0.005 MIU/L-ACNC: 0.79 MU/L (ref 0.4–4)
WBC # BLD AUTO: 8.8 10E9/L (ref 4–11)

## 2019-05-06 PROCEDURE — 99395 PREV VISIT EST AGE 18-39: CPT | Performed by: NURSE PRACTITIONER

## 2019-05-06 PROCEDURE — 99000 SPECIMEN HANDLING OFFICE-LAB: CPT | Performed by: NURSE PRACTITIONER

## 2019-05-06 PROCEDURE — 82306 VITAMIN D 25 HYDROXY: CPT | Mod: 90 | Performed by: NURSE PRACTITIONER

## 2019-05-06 PROCEDURE — 36415 COLL VENOUS BLD VENIPUNCTURE: CPT | Performed by: NURSE PRACTITIONER

## 2019-05-06 PROCEDURE — 80048 BASIC METABOLIC PNL TOTAL CA: CPT | Performed by: NURSE PRACTITIONER

## 2019-05-06 PROCEDURE — 80061 LIPID PANEL: CPT | Performed by: NURSE PRACTITIONER

## 2019-05-06 PROCEDURE — 85027 COMPLETE CBC AUTOMATED: CPT | Performed by: NURSE PRACTITIONER

## 2019-05-06 PROCEDURE — 84443 ASSAY THYROID STIM HORMONE: CPT | Performed by: NURSE PRACTITIONER

## 2019-05-06 RX ORDER — NORETHINDRONE ACETATE AND ETHINYL ESTRADIOL 1.5; 3 MG/1; UG/1
TABLET ORAL
Refills: 2 | COMMUNITY
Start: 2019-04-29 | End: 2021-11-18

## 2019-05-06 ASSESSMENT — PAIN SCALES - GENERAL: PAINLEVEL: NO PAIN (0)

## 2019-05-06 ASSESSMENT — MIFFLIN-ST. JEOR: SCORE: 1690.65

## 2019-05-06 NOTE — NURSING NOTE
"Chief Complaint   Patient presents with     Physical       Initial /74 (BP Location: Left arm, Patient Position: Chair, Cuff Size: Adult Large)   Pulse 60   Temp 98.1  F (36.7  C) (Tympanic)   Resp 16   Ht 1.626 m (5' 4\")   Wt 99.6 kg (219 lb 8 oz)   LMP 04/17/2019 (Approximate)   SpO2 99%   BMI 37.68 kg/m   Estimated body mass index is 37.68 kg/m  as calculated from the following:    Height as of this encounter: 1.626 m (5' 4\").    Weight as of this encounter: 99.6 kg (219 lb 8 oz).  Medication Reconciliation: complete    Pamela M. Lechevalier, LPN    "

## 2019-05-07 LAB — DEPRECATED CALCIDIOL+CALCIFEROL SERPL-MC: 24 UG/L (ref 20–75)

## 2019-11-04 NOTE — PROGRESS NOTES
Subjective     Jaycee Campos is a 33 year old female who presents to clinic today for the following health issues:    HPI   ABDOMINAL   PAIN     Onset: 3 weeks     Description:   Character: Sharp and Burning  Location: right upper quadrant left upper quadrant right lower quadrant left lower quadrant  Radiation: None    Intensity: mild, moderate    Progression of Symptoms:  worsening    Accompanying Signs & Symptoms:  Fever/Chills?: no   Gas/Bloating: YES- bloating   Nausea: YES  Vomitting: no   Diarrhea?: YES  Constipation:YES  Dysuria or Hematuria: no    History:   Trauma: no   Previous similar pain: YES- feels like when had h pylori    Previous tests done: none    Precipitating factors:   Does the pain change with:     Food: no      BM: no     Urination: no     Alleviating factors:  Nothing     Therapies Tried and outcome: prune juice for constipation then ended up with diarrhea     LMP:  10/15/19 approximate      She was using daily fiber supplement, stopped a few weeks ago and symptoms bagain 3 days later.      There is no problem list on file for this patient.    Past Surgical History:   Procedure Laterality Date     GYN SURGERY         Social History     Tobacco Use     Smoking status: Never Smoker     Smokeless tobacco: Never Used   Substance Use Topics     Alcohol use: No     Alcohol/week: 0.0 standard drinks     Family History   Problem Relation Age of Onset     Coronary Artery Disease Father      Diabetes Maternal Grandmother      Hypertension Maternal Grandmother      Cerebrovascular Disease Maternal Grandmother      Diabetes Paternal Grandmother      Hypertension Paternal Grandmother      Mental Illness Sister      Substance Abuse Sister          Current Outpatient Medications   Medication Sig Dispense Refill     JUNEL 1.5/30 1.5-30 MG-MCG tablet TK 1 T PO QD  2     No Known Allergies  Recent Labs   Lab Test 05/06/19  0940 04/25/18  0833 05/07/17  1110   * 184*  --    HDL 62 59  --    TRIG 111  "171*  --    ALT  --   --  22   CR 0.65  --  0.53   GFRESTIMATED >90  --  >90  Non  GFR Calc     GFRESTBLACK >90  --  >90  African American GFR Calc     POTASSIUM 4.1  --  4.0   TSH 0.79  --   --       BP Readings from Last 3 Encounters:   11/08/19 138/82   05/06/19 130/74   04/25/18 116/72    Wt Readings from Last 3 Encounters:   11/08/19 99.3 kg (218 lb 14.4 oz)   05/06/19 99.6 kg (219 lb 8 oz)   04/25/18 101.5 kg (223 lb 12.8 oz)                 Reviewed and updated as needed this visit by Provider         Review of Systems   ROS COMP: Constitutional, HEENT, cardiovascular, pulmonary, gi and gu systems are negative, except as otherwise noted.      Objective    /82 (BP Location: Right arm, Patient Position: Chair, Cuff Size: Adult Large)   Pulse 60   Temp 97.7  F (36.5  C) (Tympanic)   Resp 16   Ht 1.626 m (5' 4\")   Wt 99.3 kg (218 lb 14.4 oz)   LMP 10/15/2019 (Approximate)   SpO2 100%   BMI 37.57 kg/m    Body mass index is 37.57 kg/m .  Physical Exam   GENERAL: healthy, alert and no distress  NECK: no adenopathy, no asymmetry, masses, or scars and thyroid normal to palpation  RESP: lungs clear to auscultation - no rales, rhonchi or wheezes  CV: regular rate and rhythm, normal S1 S2, no S3 or S4, no murmur, click or rub, no peripheral edema and peripheral pulses strong  ABDOMEN: soft, nontender, no hepatosplenomegaly, no masses and bowel sounds normal  MS: no gross musculoskeletal defects noted, no edema  PSYCH: mentation appears normal, affect normal/bright    Results for orders placed or performed in visit on 11/08/19   CBC with platelets and differential     Status: Abnormal   Result Value Ref Range    WBC 9.8 4.0 - 11.0 10e9/L    RBC Count 4.49 3.8 - 5.2 10e12/L    Hemoglobin 12.4 11.7 - 15.7 g/dL    Hematocrit 39.6 35.0 - 47.0 %    MCV 88 78 - 100 fl    MCH 27.6 26.5 - 33.0 pg    MCHC 31.3 (L) 31.5 - 36.5 g/dL    RDW 13.4 10.0 - 15.0 %    Platelet Count 346 150 - 450 10e9/L    % " "Neutrophils 62.1 %    % Lymphocytes 30.4 %    % Monocytes 6.9 %    % Eosinophils 0.4 %    % Basophils 0.2 %    Absolute Neutrophil 6.1 1.6 - 8.3 10e9/L    Absolute Lymphocytes 3.0 0.8 - 5.3 10e9/L    Absolute Monocytes 0.7 0.0 - 1.3 10e9/L    Absolute Eosinophils 0.0 0.0 - 0.7 10e9/L    Absolute Basophils 0.0 0.0 - 0.2 10e9/L    Diff Method Automated Method              Assessment & Plan     1. Abdominal pain, generalized  - CBC with platelets and differential - normal  - XR ABDOMEN 2 VW (Clinic Performed); Future - moderate amoutn of stool noted - will await final report from radiology.      2. Slow transit constipation  Increase water intake  Increase fiber intake - restart metamucil.  miralax per package directions for 5-6 days then as needed   Increase exericse  I did give her to FODMAP diet handout to try to see if it will lessen the bloating symptoms.         BMI:   Estimated body mass index is 37.57 kg/m  as calculated from the following:    Height as of this encounter: 1.626 m (5' 4\").    Weight as of this encounter: 99.3 kg (218 lb 14.4 oz).           Return if symptoms worsen or fail to improve.    Kita Cuellar, NP  [facility]          "

## 2019-11-08 ENCOUNTER — ANCILLARY PROCEDURE (OUTPATIENT)
Dept: GENERAL RADIOLOGY | Facility: OTHER | Age: 34
End: 2019-11-08
Attending: NURSE PRACTITIONER
Payer: COMMERCIAL

## 2019-11-08 ENCOUNTER — OFFICE VISIT (OUTPATIENT)
Dept: FAMILY MEDICINE | Facility: OTHER | Age: 34
End: 2019-11-08
Attending: NURSE PRACTITIONER
Payer: COMMERCIAL

## 2019-11-08 VITALS
HEART RATE: 60 BPM | TEMPERATURE: 97.7 F | DIASTOLIC BLOOD PRESSURE: 82 MMHG | RESPIRATION RATE: 16 BRPM | HEIGHT: 64 IN | WEIGHT: 218.9 LBS | OXYGEN SATURATION: 100 % | SYSTOLIC BLOOD PRESSURE: 138 MMHG | BODY MASS INDEX: 37.37 KG/M2

## 2019-11-08 DIAGNOSIS — R10.84 ABDOMINAL PAIN, GENERALIZED: ICD-10-CM

## 2019-11-08 DIAGNOSIS — R10.84 ABDOMINAL PAIN, GENERALIZED: Primary | ICD-10-CM

## 2019-11-08 DIAGNOSIS — K59.01 SLOW TRANSIT CONSTIPATION: ICD-10-CM

## 2019-11-08 LAB
BASOPHILS # BLD AUTO: 0 10E9/L (ref 0–0.2)
BASOPHILS NFR BLD AUTO: 0.2 %
DIFFERENTIAL METHOD BLD: ABNORMAL
EOSINOPHIL # BLD AUTO: 0 10E9/L (ref 0–0.7)
EOSINOPHIL NFR BLD AUTO: 0.4 %
ERYTHROCYTE [DISTWIDTH] IN BLOOD BY AUTOMATED COUNT: 13.4 % (ref 10–15)
HCT VFR BLD AUTO: 39.6 % (ref 35–47)
HGB BLD-MCNC: 12.4 G/DL (ref 11.7–15.7)
LYMPHOCYTES # BLD AUTO: 3 10E9/L (ref 0.8–5.3)
LYMPHOCYTES NFR BLD AUTO: 30.4 %
MCH RBC QN AUTO: 27.6 PG (ref 26.5–33)
MCHC RBC AUTO-ENTMCNC: 31.3 G/DL (ref 31.5–36.5)
MCV RBC AUTO: 88 FL (ref 78–100)
MONOCYTES # BLD AUTO: 0.7 10E9/L (ref 0–1.3)
MONOCYTES NFR BLD AUTO: 6.9 %
NEUTROPHILS # BLD AUTO: 6.1 10E9/L (ref 1.6–8.3)
NEUTROPHILS NFR BLD AUTO: 62.1 %
PLATELET # BLD AUTO: 346 10E9/L (ref 150–450)
RBC # BLD AUTO: 4.49 10E12/L (ref 3.8–5.2)
WBC # BLD AUTO: 9.8 10E9/L (ref 4–11)

## 2019-11-08 PROCEDURE — 85025 COMPLETE CBC W/AUTO DIFF WBC: CPT | Performed by: NURSE PRACTITIONER

## 2019-11-08 PROCEDURE — 74019 RADEX ABDOMEN 2 VIEWS: CPT | Mod: TC

## 2019-11-08 PROCEDURE — 36415 COLL VENOUS BLD VENIPUNCTURE: CPT | Performed by: NURSE PRACTITIONER

## 2019-11-08 PROCEDURE — 99214 OFFICE O/P EST MOD 30 MIN: CPT | Performed by: NURSE PRACTITIONER

## 2019-11-08 ASSESSMENT — MIFFLIN-ST. JEOR: SCORE: 1687.92

## 2019-11-08 ASSESSMENT — PAIN SCALES - GENERAL: PAINLEVEL: MILD PAIN (3)

## 2019-11-08 NOTE — NURSING NOTE
"Chief Complaint   Patient presents with     Abdominal Pain     ?IBS       Initial /82 (BP Location: Right arm, Patient Position: Chair, Cuff Size: Adult Large)   Pulse 60   Temp 97.7  F (36.5  C) (Tympanic)   Resp 16   Ht 1.626 m (5' 4\")   Wt 99.3 kg (218 lb 14.4 oz)   LMP 10/15/2019 (Approximate)   SpO2 100%   BMI 37.57 kg/m   Estimated body mass index is 37.57 kg/m  as calculated from the following:    Height as of this encounter: 1.626 m (5' 4\").    Weight as of this encounter: 99.3 kg (218 lb 14.4 oz).  Medication Reconciliation: complete  Pamela M. Lechevalier, LPN    "

## 2019-11-08 NOTE — PATIENT INSTRUCTIONS
"    Assessment & Plan     1. Abdominal pain, generalized  - CBC with platelets and differential - normal  - XR ABDOMEN 2 VW (Clinic Performed); Future - moderate amoutn of stool noted    2. Slow transit constipation  Increase water intake  Increase fiber intake - restart metamucil.  miralax per package directions for 5-6 days then as needed   Increase exericse       BMI:   Estimated body mass index is 37.57 kg/m  as calculated from the following:    Height as of this encounter: 1.626 m (5' 4\").    Weight as of this encounter: 99.3 kg (218 lb 14.4 oz).           Return if symptoms worsen or fail to improve.    Kita Cuellar NP  [facility]          Patient Education     Treating Constipation    Constipation is a common and often uncomfortable problem. Constipation means you have bowel movements fewer than 3 times per week, or strain to pass hard, dry stool. It can last a short time. Or it can be a problem that never seems to go away. The good news is that it can often be treated and controlled.  Eat more fiber  One of the best ways to help treat constipation is to increase your fiber intake. You can do this either through diet or by using fiber supplements. Fiber (in whole grains, fruits, and vegetables) adds bulk and absorbs water to soften the stool. This helps the stool pass through the colon more easily. When you increase your fiber intake, do it slowly to avoid side effects such as bloating. Also increase the amount of water that you drink. Eating more of the following foods can add fiber to your diet.    High-fiber cereals    Whole grains, bran, and brown rice    Vegetables such as carrots, broccoli, and greens    Fresh fruits (especially apples, pears, and dried fruits like raisins and apricots)    Nuts and legumes (especially beans such as lentils, kidney beans, and lima beans)  Get physically active  Exercise helps improve the working of your colon which helps ease " constipation. Try to get some physical activity every day. If you haven t been active for a while, talk to your healthcare provider before starting again.  Laxatives  Your healthcare provider may suggest an over-the-counter product to help ease your constipation. He or she may suggest the use of bulk-forming agents or laxatives. The use of laxatives, if used as directed, is common and safe. Follow directions carefully when using them. See your healthcare provider for new-onset constipation, or long-term constipation, to rule out other causes such as medicines or thyroid disease.  Date Last Reviewed: 7/1/2016 2000-2018 [employer]. [address] [place] [address], [place] [address]. All rights reserved. This information is not intended as a substitute for professional medical care. Always follow your healthcare professional's instructions.

## 2020-02-28 ENCOUNTER — OFFICE VISIT (OUTPATIENT)
Dept: FAMILY MEDICINE | Facility: OTHER | Age: 35
End: 2020-02-28
Attending: NURSE PRACTITIONER
Payer: COMMERCIAL

## 2020-02-28 VITALS
TEMPERATURE: 98.8 F | RESPIRATION RATE: 16 BRPM | SYSTOLIC BLOOD PRESSURE: 128 MMHG | WEIGHT: 222.1 LBS | DIASTOLIC BLOOD PRESSURE: 72 MMHG | HEART RATE: 78 BPM | HEIGHT: 64 IN | OXYGEN SATURATION: 100 % | BODY MASS INDEX: 37.92 KG/M2

## 2020-02-28 DIAGNOSIS — F51.01 PRIMARY INSOMNIA: Primary | ICD-10-CM

## 2020-02-28 PROCEDURE — 99213 OFFICE O/P EST LOW 20 MIN: CPT | Performed by: NURSE PRACTITIONER

## 2020-02-28 RX ORDER — HYDROXYZINE PAMOATE 50 MG/1
50-100 CAPSULE ORAL
Qty: 60 CAPSULE | Refills: 1 | Status: SHIPPED | OUTPATIENT
Start: 2020-02-28 | End: 2020-02-28

## 2020-02-28 RX ORDER — HYDROXYZINE PAMOATE 50 MG/1
50-100 CAPSULE ORAL
Qty: 60 CAPSULE | Refills: 1 | Status: SHIPPED | OUTPATIENT
Start: 2020-02-28 | End: 2021-11-18

## 2020-02-28 ASSESSMENT — MIFFLIN-ST. JEOR: SCORE: 1697.44

## 2020-02-28 ASSESSMENT — PAIN SCALES - GENERAL: PAINLEVEL: NO PAIN (0)

## 2020-02-28 NOTE — PROGRESS NOTES
Subjective     Jaycee Campos is a 34 year old female who presents to clinic today for the following health issues:    HPI   Not Sleeping      Duration: Since mid Jan    Description (location/character/radiation): Not sleeping well, waking up a lot    Intensity:  mild    Accompanying signs and symptoms: tired during the day    History (similar episodes/previous evaluation): None    Precipitating or alleviating factors: None    Therapies tried and outcome: Magnesium, sleep aid (Zquil), massage, hot bath before bedtime, melatonin, benadryl     PHQ 4/9/2018 4/25/2018   PHQ-9 Total Score 0 0   Q9: Thoughts of better off dead/self-harm past 2 weeks Not at all Not at all     NICOLE-7 SCORE 4/9/2018 4/25/2018   Total Score 0 0         There is no problem list on file for this patient.    Past Surgical History:   Procedure Laterality Date     GYN SURGERY         Social History     Tobacco Use     Smoking status: Never Smoker     Smokeless tobacco: Never Used   Substance Use Topics     Alcohol use: No     Alcohol/week: 0.0 standard drinks     Family History   Problem Relation Age of Onset     Coronary Artery Disease Father      Diabetes Maternal Grandmother      Hypertension Maternal Grandmother      Cerebrovascular Disease Maternal Grandmother      Diabetes Paternal Grandmother      Hypertension Paternal Grandmother      Mental Illness Sister      Substance Abuse Sister          Current Outpatient Medications   Medication Sig Dispense Refill     hydrOXYzine (VISTARIL) 50 MG capsule Take 1-2 capsules ( mg) by mouth nightly as needed for other (insomnia) 60 capsule 1     JUNEL 1.5/30 1.5-30 MG-MCG tablet TK 1 T PO QD  2     No Known Allergies  Recent Labs   Lab Test 05/06/19  0940 04/25/18  0833 05/07/17  1110   * 184*  --    HDL 62 59  --    TRIG 111 171*  --    ALT  --   --  22   CR 0.65  --  0.53   GFRESTIMATED >90  --  >90  Non  GFR Calc     GFRESTBLACK >90  --  >90  African American GFR Calc    "  POTASSIUM 4.1  --  4.0   TSH 0.79  --   --       BP Readings from Last 3 Encounters:   02/28/20 128/72   11/08/19 138/82   05/06/19 130/74    Wt Readings from Last 3 Encounters:   02/28/20 100.7 kg (222 lb 1.6 oz)   11/08/19 99.3 kg (218 lb 14.4 oz)   05/06/19 99.6 kg (219 lb 8 oz)               Reviewed and updated as needed this visit by Provider         Review of Systems   ROS COMP: Constitutional, HEENT, cardiovascular, pulmonary, gi and gu systems are negative, except as otherwise noted.      Objective    /72 (BP Location: Left arm, Patient Position: Sitting, Cuff Size: Adult Regular)   Pulse 78   Temp 98.8  F (37.1  C) (Tympanic)   Resp 16   Ht 1.626 m (5' 4\")   Wt 100.7 kg (222 lb 1.6 oz)   SpO2 100%   BMI 38.12 kg/m    Body mass index is 38.12 kg/m .  Physical Exam   GENERAL: healthy, alert and no distress  MS: no gross musculoskeletal defects noted, no edema  PSYCH: mentation appears normal, affect normal/bright            Assessment & Plan     1. Primary insomnia  - hydrOXYzine (VISTARIL) 50 MG capsule; Take 1-2 capsules ( mg) by mouth nightly as needed for other (insomnia)  Dispense: 60 capsule; Refill: 1  - good sleep hygiene  - consider sleep study     BMI:   Estimated body mass index is 38.12 kg/m  as calculated from the following:    Height as of this encounter: 1.626 m (5' 4\").    Weight as of this encounter: 100.7 kg (222 lb 1.6 oz).   Weight management plan: Discussed healthy diet and exercise guidelines        Return if symptoms worsen or fail to improve.    Kita Cuellar NP  [facility]      "

## 2020-02-28 NOTE — PATIENT INSTRUCTIONS
"        Assessment & Plan     1. Primary insomnia  - hydrOXYzine (VISTARIL) 50 MG capsule; Take 1-2 capsules ( mg) by mouth nightly as needed for other (insomnia)  Dispense: 60 capsule; Refill: 1  - good sleep hygiene  - consider sleep study     BMI:   Estimated body mass index is 38.12 kg/m  as calculated from the following:    Height as of this encounter: 1.626 m (5' 4\").    Weight as of this encounter: 100.7 kg (222 lb 1.6 oz).   Weight management plan: Discussed healthy diet and exercise guidelines        Return if symptoms worsen or fail to improve.    Kita Cuellar, NP  [facility]      "

## 2020-02-28 NOTE — NURSING NOTE
"Chief Complaint   Patient presents with     Sleep Problem       Initial /72 (BP Location: Left arm, Patient Position: Sitting, Cuff Size: Adult Regular)   Pulse 78   Temp 98.8  F (37.1  C) (Tympanic)   Resp 16   Ht 1.626 m (5' 4\")   Wt 100.7 kg (222 lb 1.6 oz)   SpO2 100%   BMI 38.12 kg/m   Estimated body mass index is 38.12 kg/m  as calculated from the following:    Height as of this encounter: 1.626 m (5' 4\").    Weight as of this encounter: 100.7 kg (222 lb 1.6 oz).  Medication Reconciliation: complete  Lakeisha Gil MA  "

## 2020-06-05 ENCOUNTER — E-VISIT (OUTPATIENT)
Dept: FAMILY MEDICINE | Facility: OTHER | Age: 35
End: 2020-06-05
Payer: COMMERCIAL

## 2020-06-05 DIAGNOSIS — R19.7 DIARRHEA, UNSPECIFIED TYPE: ICD-10-CM

## 2020-06-05 PROCEDURE — 99421 OL DIG E/M SVC 5-10 MIN: CPT | Performed by: NURSE PRACTITIONER

## 2020-06-08 NOTE — TELEPHONE ENCOUNTER
Provider E-Visit time total (minutes): 9    ELECTRONIC VISIT DOCUMENTATION:    SUBJECTIVE:  Note above accurately captures the substance of my conversation with the patient.    ASSESSMENT/ PLAN:  1. Diarrhea, unspecified type  Increase fluids, bland diet    Kita Cuellar,   Certified Adult Nurse Practitioner  [phone]

## 2020-09-01 NOTE — PROGRESS NOTES
Subjective     Jaycee Campos is a 34 year old female who presents to clinic today for the following health issues:    HPI       Concern - sweating and shakey  Onset: year and a half   Description: gets episodes of shakey and sweating   Intensity: mild, moderate  Progression of Symptoms:  worsening and intermittent  Accompanying Signs & Symptoms: nausea before the shakes  Previous history of similar problem: yes   Precipitating factors:        Worsened by: unknown   Alleviating factors:        Improved by: eating and laying down   Therapies tried and outcome: has checked blood glucose and was 76 then after eating went up to 120 felt better       Concern - Right Side Pain   Onset: 10 years   Description: right side pain with bowel movements   Intensity: mild, moderate  Progression of Symptoms:  same and intermittent  Accompanying Signs & Symptoms: constipation has to help stool move   Then has diarrhea   Previous history of similar problem: yes  Precipitating factors:        Worsened by: unknown   Alleviating factors:        Improved by: nothing was diagnosed with IBS  Therapies tried and outcome: she has tried FODMAP diet, eliminating dairy and eggs.  She has tried miralax twice daily with no improvement in constipation.  She does drink plenty of water.      There is no problem list on file for this patient.    Past Surgical History:   Procedure Laterality Date     GYN SURGERY         Social History     Tobacco Use     Smoking status: Never Smoker     Smokeless tobacco: Never Used   Substance Use Topics     Alcohol use: No     Alcohol/week: 0.0 standard drinks     Family History   Problem Relation Age of Onset     Coronary Artery Disease Father      Diabetes Maternal Grandmother      Hypertension Maternal Grandmother      Cerebrovascular Disease Maternal Grandmother      Diabetes Paternal Grandmother      Hypertension Paternal Grandmother      Mental Illness Sister      Substance Abuse Sister          Current  "Outpatient Medications   Medication Sig Dispense Refill     hydrOXYzine (VISTARIL) 50 MG capsule Take 1-2 capsules ( mg) by mouth nightly as needed for other (insomnia) 60 capsule 1     JUNEL 1.5/30 1.5-30 MG-MCG tablet TK 1 T PO QD  2     No Known Allergies  Recent Labs   Lab Test 05/06/19  0940 04/25/18  0833 05/07/17  1110   * 184*  --    HDL 62 59  --    TRIG 111 171*  --    ALT  --   --  22   CR 0.65  --  0.53   GFRESTIMATED >90  --  >90  Non  GFR Calc     GFRESTBLACK >90  --  >90  African American GFR Calc     POTASSIUM 4.1  --  4.0   TSH 0.79  --   --       BP Readings from Last 3 Encounters:   09/02/20 128/78   02/28/20 128/72   11/08/19 138/82    Wt Readings from Last 3 Encounters:   09/02/20 109.7 kg (241 lb 14.4 oz)   02/28/20 100.7 kg (222 lb 1.6 oz)   11/08/19 99.3 kg (218 lb 14.4 oz)                      Review of Systems   Constitutional, HEENT, cardiovascular, pulmonary, gi and gu systems are negative, except as otherwise noted.      Objective    /78 (BP Location: Left arm, Patient Position: Chair, Cuff Size: Adult Large)   Pulse 85   Temp 98.9  F (37.2  C) (Tympanic)   Resp 18   Ht 1.626 m (5' 4\")   Wt 109.7 kg (241 lb 14.4 oz)   SpO2 99%   BMI 41.52 kg/m    Body mass index is 41.52 kg/m .  Physical Exam   GENERAL: healthy, alert and no distress  NECK: no adenopathy, no asymmetry, masses, or scars and thyroid normal to palpation  RESP: lungs clear to auscultation - no rales, rhonchi or wheezes  CV: regular rate and rhythm, normal S1 S2, no S3 or S4, no murmur, click or rub, no peripheral edema and peripheral pulses strong  ABDOMEN: soft, nontender, no hepatosplenomegaly, no masses and bowel sounds normal  MS: no gross musculoskeletal defects noted, no edema  PSYCH: mentation appears normal, affect normal/bright            Assessment & Plan     1. Hypoglycemia  CGMS - rule out hypoglycemia  - DIABETES EDUCATION REFERRAL ([place])    2. Irritable bowel " "syndrome with both constipation and diarrhea  Continue Miralax as needed, exercise, avoidance of dailry and eggs.    - TSH with free T4 reflex  - Basic metabolic panel  - CBC with platelets differential  - CT Abdomen Pelvis w/o Contrast; Future  - Tissue transglutaminase karen IgA and IgG  - GASTROENTEROLOGY ADULT REF CONSULT ONLY; Future    3. Morbid obesity (H)  - Lipid Profile    4. Generalized abdominal pain  Dr rTujillo   - GASTROENTEROLOGY ADULT REF CONSULT ONLY; Future       BMI:   Estimated body mass index is 41.52 kg/m  as calculated from the following:    Height as of this encounter: 1.626 m (5' 4\").    Weight as of this encounter: 109.7 kg (241 lb 14.4 oz).         Will notify of results as they are returned.     Follow-up as needed    Kita Cuellar, NP  [facility]    "

## 2020-09-02 ENCOUNTER — OFFICE VISIT (OUTPATIENT)
Dept: FAMILY MEDICINE | Facility: OTHER | Age: 35
End: 2020-09-02
Attending: NURSE PRACTITIONER
Payer: COMMERCIAL

## 2020-09-02 VITALS
SYSTOLIC BLOOD PRESSURE: 128 MMHG | HEART RATE: 85 BPM | OXYGEN SATURATION: 99 % | HEIGHT: 64 IN | BODY MASS INDEX: 41.3 KG/M2 | DIASTOLIC BLOOD PRESSURE: 78 MMHG | RESPIRATION RATE: 18 BRPM | TEMPERATURE: 98.9 F | WEIGHT: 241.9 LBS

## 2020-09-02 DIAGNOSIS — E16.2 HYPOGLYCEMIA: Primary | ICD-10-CM

## 2020-09-02 DIAGNOSIS — K58.2 IRRITABLE BOWEL SYNDROME WITH BOTH CONSTIPATION AND DIARRHEA: ICD-10-CM

## 2020-09-02 DIAGNOSIS — E66.01 MORBID OBESITY (H): ICD-10-CM

## 2020-09-02 DIAGNOSIS — R10.84 GENERALIZED ABDOMINAL PAIN: ICD-10-CM

## 2020-09-02 LAB
BASOPHILS # BLD AUTO: 0 10E9/L (ref 0–0.2)
BASOPHILS NFR BLD AUTO: 0.2 %
DIFFERENTIAL METHOD BLD: ABNORMAL
EOSINOPHIL # BLD AUTO: 0.1 10E9/L (ref 0–0.7)
EOSINOPHIL NFR BLD AUTO: 0.4 %
ERYTHROCYTE [DISTWIDTH] IN BLOOD BY AUTOMATED COUNT: 13.9 % (ref 10–15)
HCT VFR BLD AUTO: 39.6 % (ref 35–47)
HGB BLD-MCNC: 12.7 G/DL (ref 11.7–15.7)
LYMPHOCYTES # BLD AUTO: 3 10E9/L (ref 0.8–5.3)
LYMPHOCYTES NFR BLD AUTO: 26.1 %
MCH RBC QN AUTO: 27.7 PG (ref 26.5–33)
MCHC RBC AUTO-ENTMCNC: 32.1 G/DL (ref 31.5–36.5)
MCV RBC AUTO: 86 FL (ref 78–100)
MONOCYTES # BLD AUTO: 0.9 10E9/L (ref 0–1.3)
MONOCYTES NFR BLD AUTO: 8.2 %
NEUTROPHILS # BLD AUTO: 7.4 10E9/L (ref 1.6–8.3)
NEUTROPHILS NFR BLD AUTO: 65.1 %
PLATELET # BLD AUTO: 385 10E9/L (ref 150–450)
RBC # BLD AUTO: 4.59 10E12/L (ref 3.8–5.2)
WBC # BLD AUTO: 11.3 10E9/L (ref 4–11)

## 2020-09-02 PROCEDURE — 80061 LIPID PANEL: CPT | Performed by: NURSE PRACTITIONER

## 2020-09-02 PROCEDURE — 80048 BASIC METABOLIC PNL TOTAL CA: CPT | Performed by: NURSE PRACTITIONER

## 2020-09-02 PROCEDURE — 85025 COMPLETE CBC W/AUTO DIFF WBC: CPT | Performed by: NURSE PRACTITIONER

## 2020-09-02 PROCEDURE — 99214 OFFICE O/P EST MOD 30 MIN: CPT | Performed by: NURSE PRACTITIONER

## 2020-09-02 PROCEDURE — 84443 ASSAY THYROID STIM HORMONE: CPT | Performed by: NURSE PRACTITIONER

## 2020-09-02 PROCEDURE — 36415 COLL VENOUS BLD VENIPUNCTURE: CPT | Performed by: NURSE PRACTITIONER

## 2020-09-02 PROCEDURE — 83516 IMMUNOASSAY NONANTIBODY: CPT | Performed by: NURSE PRACTITIONER

## 2020-09-02 PROCEDURE — 83516 IMMUNOASSAY NONANTIBODY: CPT | Mod: 59 | Performed by: NURSE PRACTITIONER

## 2020-09-02 ASSESSMENT — MIFFLIN-ST. JEOR: SCORE: 1787.25

## 2020-09-02 ASSESSMENT — PAIN SCALES - GENERAL: PAINLEVEL: MILD PAIN (3)

## 2020-09-02 NOTE — PATIENT INSTRUCTIONS
"    Assessment & Plan     1. Hypoglycemia  CGMS   - DIABETES EDUCATION REFERRAL ([place])    2. Irritable bowel syndrome with both constipation and diarrhea  Continue Miralax as needed, exercise, avoidance of dailry and eggs.    - TSH with free T4 reflex  - Basic metabolic panel  - CBC with platelets differential  - CT Abdomen Pelvis w/o Contrast; Future  - Tissue transglutaminase karen IgA and IgG  - GASTROENTEROLOGY ADULT REF CONSULT ONLY; Future    3. Morbid obesity (H)  - Lipid Profile    4. Generalized abdominal pain  Dr Trujillo   - GASTROENTEROLOGY ADULT REF CONSULT ONLY; Future       BMI:   Estimated body mass index is 41.52 kg/m  as calculated from the following:    Height as of this encounter: 1.626 m (5' 4\").    Weight as of this encounter: 109.7 kg (241 lb 14.4 oz).         Will notify of results as they are returned.     Follow-up as needed    Kita Cuellar, NP  [facility]    "

## 2020-09-02 NOTE — NURSING NOTE
"Chief Complaint   Patient presents with     Excessive Sweating     Shaking       Initial /78 (BP Location: Left arm, Patient Position: Chair, Cuff Size: Adult Large)   Pulse 85   Temp 98.9  F (37.2  C) (Tympanic)   Resp 18   Ht 1.626 m (5' 4\")   Wt 109.7 kg (241 lb 14.4 oz)   SpO2 99%   BMI 41.52 kg/m   Estimated body mass index is 41.52 kg/m  as calculated from the following:    Height as of this encounter: 1.626 m (5' 4\").    Weight as of this encounter: 109.7 kg (241 lb 14.4 oz).  Medication Reconciliation: complete  Pamela M. Lechevalier, LPN    "

## 2020-09-03 LAB
ANION GAP SERPL CALCULATED.3IONS-SCNC: 7 MMOL/L (ref 3–14)
BUN SERPL-MCNC: 8 MG/DL (ref 7–30)
CALCIUM SERPL-MCNC: 9.4 MG/DL (ref 8.5–10.1)
CHLORIDE SERPL-SCNC: 106 MMOL/L (ref 94–109)
CHOLEST SERPL-MCNC: 283 MG/DL
CO2 SERPL-SCNC: 23 MMOL/L (ref 20–32)
CREAT SERPL-MCNC: 0.58 MG/DL (ref 0.52–1.04)
GFR SERPL CREATININE-BSD FRML MDRD: >90 ML/MIN/{1.73_M2}
GLUCOSE SERPL-MCNC: 85 MG/DL (ref 70–99)
HDLC SERPL-MCNC: 64 MG/DL
LDLC SERPL CALC-MCNC: 160 MG/DL
NONHDLC SERPL-MCNC: 219 MG/DL
POTASSIUM SERPL-SCNC: 3.9 MMOL/L (ref 3.4–5.3)
SODIUM SERPL-SCNC: 136 MMOL/L (ref 133–144)
TRIGL SERPL-MCNC: 295 MG/DL
TSH SERPL DL<=0.005 MIU/L-ACNC: 1.07 MU/L (ref 0.4–4)

## 2020-09-04 LAB
TTG IGA SER-ACNC: 1 U/ML
TTG IGG SER-ACNC: <1 U/ML

## 2020-09-09 ENCOUNTER — ALLIED HEALTH/NURSE VISIT (OUTPATIENT)
Dept: EDUCATION SERVICES | Facility: OTHER | Age: 35
End: 2020-09-09
Attending: NURSE PRACTITIONER
Payer: COMMERCIAL

## 2020-09-09 DIAGNOSIS — E11.65 TYPE 2 DIABETES MELLITUS WITH HYPERGLYCEMIA, UNSPECIFIED WHETHER LONG TERM INSULIN USE (H): Primary | ICD-10-CM

## 2020-09-09 DIAGNOSIS — E16.2 HYPOGLYCEMIA: Primary | ICD-10-CM

## 2020-09-16 ENCOUNTER — TRANSFERRED RECORDS (OUTPATIENT)
Dept: HEALTH INFORMATION MANAGEMENT | Facility: CLINIC | Age: 35
End: 2020-09-16

## 2020-09-16 ENCOUNTER — ALLIED HEALTH/NURSE VISIT (OUTPATIENT)
Dept: EDUCATION SERVICES | Facility: OTHER | Age: 35
End: 2020-09-16
Attending: NURSE PRACTITIONER
Payer: COMMERCIAL

## 2020-09-16 DIAGNOSIS — E11.65 TYPE 2 DIABETES MELLITUS WITH HYPERGLYCEMIA, UNSPECIFIED WHETHER LONG TERM INSULIN USE (H): Primary | ICD-10-CM

## 2020-09-16 DIAGNOSIS — E16.2 HYPOGLYCEMIA: ICD-10-CM

## 2020-09-16 PROCEDURE — 95250 CONT GLUC MNTR PHYS/QHP EQP: CPT

## 2020-09-16 NOTE — PROGRESS NOTES
Jaycee is here for a glucose sensor insertion for a CGM study. Sensor agreement signed.    Sensor attached to right upper outer arm. No redness, drainage or bleeding noted. Patient instructed on testing schedule, how to complete the patient log, restrictions during wear and to remove if having an x-ray, MRI or CT.    Patient return date requested from Gisela Parry.    Freestyle Marlys Sensor:  Lot#:[id number]  Expiration Date: 12/31/20  Sensor S/N: [id number]    Patient's identity was verified by using patient's name and date of birth prior to insertion.    Sensor started and 2 minute re-check completed.    Written instructions and patient log given to patient.  Nora Martinez LPN

## 2020-09-17 ENCOUNTER — TELEPHONE (OUTPATIENT)
Dept: FAMILY MEDICINE | Facility: OTHER | Age: 35
End: 2020-09-17

## 2020-09-28 ENCOUNTER — E-VISIT (OUTPATIENT)
Dept: FAMILY MEDICINE | Facility: OTHER | Age: 35
End: 2020-09-28

## 2020-09-28 ENCOUNTER — NURSE TRIAGE (OUTPATIENT)
Dept: FAMILY MEDICINE | Facility: OTHER | Age: 35
End: 2020-09-28

## 2020-09-28 ENCOUNTER — OFFICE VISIT (OUTPATIENT)
Dept: FAMILY MEDICINE | Facility: OTHER | Age: 35
End: 2020-09-28
Attending: NURSE PRACTITIONER
Payer: COMMERCIAL

## 2020-09-28 DIAGNOSIS — U07.1 2019 NOVEL CORONAVIRUS DISEASE (COVID-19): ICD-10-CM

## 2020-09-28 DIAGNOSIS — Z20.822 SUSPECTED 2019 NOVEL CORONAVIRUS INFECTION: Primary | ICD-10-CM

## 2020-09-28 DIAGNOSIS — R05.9 COUGH: Primary | ICD-10-CM

## 2020-09-28 PROCEDURE — 99421 OL DIG E/M SVC 5-10 MIN: CPT | Performed by: NURSE PRACTITIONER

## 2020-09-28 PROCEDURE — U0003 INFECTIOUS AGENT DETECTION BY NUCLEIC ACID (DNA OR RNA); SEVERE ACUTE RESPIRATORY SYNDROME CORONAVIRUS 2 (SARS-COV-2) (CORONAVIRUS DISEASE [COVID-19]), AMPLIFIED PROBE TECHNIQUE, MAKING USE OF HIGH THROUGHPUT TECHNOLOGIES AS DESCRIBED BY CMS-2020-01-R: HCPCS | Performed by: NURSE PRACTITIONER

## 2020-09-28 NOTE — TELEPHONE ENCOUNTER
"Discharge Instructions for COVID-19 Patients  You have--or may have--COVID-19. Please follow the instructions listed below.   If you have a weakened immune system, discuss with your doctor any other actions you need to take.  How can I protect others?  If you have symptoms (fever, cough, body aches or trouble breathing):    Stay home and away from others (self-isolate) until:  ? At least 10 days have passed since your symptoms started, And   ? You've had no fever--and no medicine that reduces fever--for 1 full day (24 hours), And    ? Your other symptoms have resolved (gotten better).  If you don't show symptoms, but testing showed that you have COVID-19:    Stay home and away from others (self-isolate). Follow the tips under \"How do I self-isolate?\" below for 10 days (20 days if you have a weak immune system).    You don't need to be retested for COVID-19 before going back to school or work. As long as you're fever-free and feeling better, you can go back to school, work and other activities after waiting the 10 or 20 days.   How do I self-isolate?    Stay in your own room, even for meals. Use your own bathroom if you can.    Stay away from others in your home. No hugging, kissing or shaking hands. No visitors.    Don't go to work, school or anywhere else.    Clean \"high touch\" surfaces often (doorknobs, counters, handles). Use household cleaning spray or wipes. You'll find a full list of  on the EPA website: www.epa.gov/pesticide-registration/list-n-disinfectants-use-against-sars-cov-2.    Cover your mouth and nose with a mask or other face covering to avoid spreading germs.    Wash your hands and face often. Use soap and water.    Caregivers in these groups are at risk for severe illness due to COVID-19:  ? People 65 years and older  ? People who live in a nursing home or long-term care facility  ? People with chronic disease (lung, heart, cancer, diabetes, kidney, liver, immunologic)  ? People who have a " weakened immune system, including those who:    Are in cancer treatment    Take medicine that weakens the immune system, such as corticosteroids    Had a bone marrow or organ transplant    Have an immune deficiency    Have poorly controlled HIV or AIDS    Are obese (body mass index of 40 or higher)    Smoke regularly    Caregivers should wear gloves while washing dishes, handling laundry and cleaning bedrooms and bathrooms.    Use caution when washing and drying laundry: Don't shake dirty laundry and use the warmest water setting that you can.    For more tips on managing your health at home, go to www.cdc.gov/coronavirus/2019-ncov/downloads/10Things.pdf.  How can I take care of myself at home?  1. Get lots of rest. Drink extra fluids (unless a doctor has told you not to).    2. Take Tylenol (acetaminophen) for fever or pain. If you have liver or kidney problems, ask your family doctor if it's okay to take Tylenol.     Adults can take either:  ? 650 mg (two 325 mg pills) every 4 to 6 hours, or   ? 1,000 mg (two 500 mg pills) every 8 hours as needed.  ? Note: Don't take more than 3,000 mg in one day. Acetaminophen is found in many medicines (both prescribed and over-the-counter medicines). Read all labels to be sure you don't take too much.   For children, check the Tylenol bottle for the right dose. The dose is based on the child's age or weight.  3. If you have other health problems (like cancer, heart failure, an organ transplant or severe kidney disease): Call your specialty clinic if you don't feel better in the next 2 days.    4. Know when to call 911. Emergency warning signs include:  ? Trouble breathing or shortness of breath  ? Pain or pressure in the chest that doesn't go away  ? Feeling confused like you haven't felt before, or not being able to wake up  ? Bluish-colored lips or face    5. Your doctor may have prescribed a blood thinner medicine. Follow their instructions.  Where can I get more  information?    [facility] - About COVID-19: [email or web address]    CDC - What to Do If You're Sick: www.cdc.gov/coronavirus/2019-ncov/about/steps-when-sick.html    CDC - Ending Home Isolation: www.cdc.gov/coronavirus/2019-ncov/hcp/disposition-in-home-patients.html    CDC - Caring for Someone: www.cdc.gov/coronavirus/2019-ncov/if-you-are-sick/care-for-someone.html    [facility] - Interim Guidance for Hospital Discharge to Home: www.health.[facility].[place]./diseases/coronavirus/hcp/hospdischarge.pdf    [facility] clinical trials (COVID-19 research studies): clinicalaffairs.[facility]-clinical-trials    Below are the COVID-19 hotlines at the [place] Department of [facility] ([facility]). Interpreters are available.  ? For health questions: Call 656-892-8405 or 1-690.760.1218 (7 a.m. to 7 p.m.)  ? For questions about schools and childcare: Call 923-307-3613 or 1-594.664.4770 (7 a.m. to 7 p.m.)  She feels she can manage her s/s at home.She verbalized understanding of below.    Lesly Love RN    For informational purposes only. Not to replace the advice of your health care provider. Clinically reviewed by the Infection Prevention Team. Copyright   2020 [facility]. All rights reserved. Kenzei 201258 - REV 08/04/20.        Instructions for Patients  It is recommended that you have a test for coronavirus (COVID-19). This illness can cause fever, cough and trouble breathing. Many people get a mild case and get better on their own. Some people can get very sick.     Please follow these steps:    1. We will call to schedule your test.  2. A member of our care team will ask you some questions. Then, they will use a swab to collect samples from your nose and throat.     Our testing team will send you your test results.    How can I protect others?    Stay home and away from others (self-isolate) until:    You ve had no fever--and no medicine that reduces fever--for 1 full day (24 hours). And      Your  other symptoms have resolved (gotten better). For example, your cough or breathing has improved. And     At least 10 days have passed since your symptoms started.    Stay at least 6 feet away from others. (If someone will drive you to your test, stay in the backseat, as far away from the  as you can.)     Don t go to work, school or anywhere else. When it s time for your test, go straight to the testing site. Don t make any stops on the way there or back.     Wash your hands and face often. Use soap and water.     Cover your mouth and nose with a mask, tissue or washcloth.     Don t touch anyone. No hugging, kissing or handshakes.    How can I take care of myself?    1. Get lots of rest. Drink extra fluids (unless a doctor has told you not to).     2. Take Tylenol (acetaminophen) for fever or pain. If you have liver or kidney problems, ask your family doctor if it's okay to take Tylenol.     Adults can take either:     650 mg (two 325 mg pills) every 4 to 6 hours, or     1,000 mg (two 500 mg pills) every 8 hours as needed.     Note: Don't take more than 3,000 mg in one day.   Acetaminophen is found in many medicines (both prescribed and over-the-counter medicines). Read all labels to be sure you don't take too much.   For children, check the Tylenol bottle for the right dose. The dose is based on  the child's age or weight.    3. If you have other health problems (like cancer, heart failure, an organ transplant or severe kidney disease): Call your specialty clinic if you don't feel better in the next 2 days.    4. Know when to call 911: If your breathing is so bad that it keeps you from doing normal activities, call 911 or go to the emergency room. Tell them that you've been staying home and may have COVID-19.      Thank you for taking steps to prevent the spread of this virus.  o Limit your contact with others.  o Wear a simple mask to cover your cough.  o Wash your hands well and often.  o If you need  medical care, go to [email or web address] or contact your health care provider.     For more about COVID-19 and caring for yourself at home, visit the CDC website at https://www.cdc.gov/coronavirus/2019-ncov/about/steps-when-sick.html.     To learn about care at [facility], please go to [email or web address].     [facility] clinical trials (COVID-19 research studies): clinicalaffairs.[facility]-clinical-trials.    Below are the COVID-19 hotlines at the [facility] ([facility]). Interpreters are available.     For health questions: Call 392-478-9417 or 1-465.557.7837 (7 a.m. to 7 p.m.)    For questions about schools and childcare: Call 118-627-2122 or 1-718.615.3901 (7 a.m. to 7 p.m.)      COVID 19 Nurse Triage Plan/Patient Instructions    Please be aware that novel coronavirus (COVID-19) may be circulating in the community. If you develop symptoms such as fever, cough, or SOB or if you have concerns about the presence of another infection including coronavirus (COVID-19), please contact your health care provider or visit [email or web address].     Disposition/Instructions    Home care recommended. Follow home care protocol based instructions.    Thank you for taking steps to prevent the spread of this virus.  o Limit your contact with others.  o Wear a simple mask to cover your cough.  o Wash your hands well and often.    Resources    [facility]: About COVID-19: [email or web address]/    CDC: What to Do If You're Sick: www.cdc.gov/coronavirus/2019-ncov/about/steps-when-sick.html    CDC: Ending Home Isolation: www.cdc.gov/coronavirus/2019-ncov/hcp/disposition-in-home-patients.html     CDC: Caring for Someone: www.cdc.gov/coronavirus/2019-ncov/if-you-are-sick/care-for-someone.html     [facility]: Interim Guidance for Hospital Discharge to Home: www.health.[facility].mn.us/diseases/coronavirus/hcp/hospdischarge.pdf    [facility] clinical trials (COVID-19  research studies): clinicalaffairs.[facility]-clinical-trials     Below are the COVID-19 hotlines at the [place] Department of [facility] ([facility]). Interpreters are available.   o For health questions: Call 352-031-8084 or 1-315.954.8641 (7 a.m. to 7 p.m.)  o For questions about schools and childcare: Call 207-799-5809 or 1-592.368.8066 (7 a.m. to 7 p.m.)                     Reason for Disposition    [1] COVID-19 infection suspected by caller or triager AND [2] mild symptoms (cough, fever, or others) AND [3] no complications or SOB    Additional Information    Negative: SEVERE difficulty breathing (e.g., struggling for each breath, speaks in single words)    Negative: Difficult to awaken or acting confused (e.g., disoriented, slurred speech)    Negative: Bluish (or gray) lips or face now    Negative: Shock suspected (e.g., cold/pale/clammy skin, too weak to stand, low BP, rapid pulse)    Negative: Sounds like a life-threatening emergency to the triager    Negative: [1] COVID-19 exposure AND [2] no symptoms    Negative: COVID-19 and Breastfeeding, questions about    Negative: [1] Adult with possible COVID-19 symptoms AND [2] triager concerned about severity of symptoms or other causes    Negative: SEVERE or constant chest pain or pressure (Exception: mild central chest pain, present only when coughing)    Negative: MODERATE difficulty breathing (e.g., speaks in phrases, SOB even at rest, pulse 100-120)    Negative: Patient sounds very sick or weak to the triager    Negative: MILD difficulty breathing (e.g., minimal/no SOB at rest, SOB with walking, pulse <100)    Negative: Chest pain or pressure    Negative: Fever > 103 F (39.4 C)    Negative: [1] Fever > 101 F (38.3 C) AND [2] age > 60    Negative: [1] Fever > 100.0 F (37.8 C) AND [2] bedridden (e.g., nursing home patient, CVA, chronic illness, recovering from surgery)    Negative: HIGH RISK patient (e.g., age > 64 years, diabetes, heart or lung disease, weak immune  "system) (Exception: Has already been evaluated by healthcare provider and has no new or worsening symptoms)    Negative: Fever present > 3 days (72 hours)    Negative: [1] Fever returns after gone for over 24 hours AND [2] symptoms worse or not improved    Negative: [1] Continuous (nonstop) coughing interferes with work or school AND [2] no improvement using cough treatment per protocol    Answer Assessment - Initial Assessment Questions  1. COVID-19 DIAGNOSIS: \"Who made your Coronavirus (COVID-19) diagnosis?\" \"Was it confirmed by a positive lab test?\" If not diagnosed by a HCP, ask \"Are there lots of cases (community spread) where you live?\" (See public health department website, if unsure)        2. ONSET: \"When did the COVID-19 symptoms start?\"      Saturday  3. WORST SYMPTOM: \"What is your worst symptom?\" (e.g., cough, fever, shortness of breath, muscle aches)     Nasal congestion and sinus pressure and pain  4. COUGH: \"Do you have a cough?\" If so, ask: \"How bad is the cough?\"        Yes-pretty mild  5. FEVER: \"Do you have a fever?\" If so, ask: \"What is your temperature, how was it measured, and when did it start?\"     Last night 100.2 with oral  6. RESPIRATORY STATUS: \"Describe your breathing?\" (e.g., shortness of breath, wheezing, unable to speak)       no  7. BETTER-SAME-WORSE: \"Are you getting better, staying the same or getting worse compared to yesterday?\"  If getting worse, ask, \"In what way?\"      same  8. HIGH RISK DISEASE: \"Do you have any chronic medical problems?\" (e.g., asthma, heart or lung disease, weak immune system, etc.)     no9. PREGNANCY: \"Is there any chance you are pregnant?\" \"When was your last menstrual period?\"      no  10. OTHER SYMPTOMS: \"Do you have any other symptoms?\"  (e.g., chills, fatigue, headache, loss of smell or taste, muscle pain, sore throat)        HA, did have muscle pain, chills    Protocols used: CORONAVIRUS (COVID-19) DIAGNOSED OR [id number] 8.4.20      "

## 2020-09-28 NOTE — TELEPHONE ENCOUNTER
Provider E-Visit time total (minutes): 15    ELECTRONIC VISIT DOCUMENTATION:    SUBJECTIVE:  Note above accurately captures the substance of my conversation with the patient.    ASSESSMENT/ PLAN:  1. 2019 novel coronavirus disease (COVID-19)  Self-isolation reviewed,    2. Cough  covid positive.    - Symptomatic COVID-19 Virus (Coronavirus) by PCR; Future      Please call and schedule lab only appt for covid test.

## 2020-09-29 ENCOUNTER — TELEPHONE (OUTPATIENT)
Dept: NURSING | Facility: CLINIC | Age: 35
End: 2020-09-29

## 2020-09-29 LAB
SARS-COV-2 RNA SPEC QL NAA+PROBE: ABNORMAL
SPECIMEN SOURCE: ABNORMAL

## 2020-09-30 NOTE — TELEPHONE ENCOUNTER
Patient updated on her positive covid results.    Betty Hernandez RN  [facility] Nurse Advisor

## 2020-09-30 NOTE — TELEPHONE ENCOUNTER
Coronavirus (COVID-19) Notification    Patient  Patient: Jaycee Campos    Coronavirus (COVID-19) Notification     Reason for call  Notify of POSITIVE  COVID-19 lab result, assess symptoms,  review [facility] recommendations     Lab Result   Lab test for 2019-nCoV rRt-PCR or SARS-COV-2 PCR  Oropharyngeal AND/OR nasopharyngeal swabs were POSITIVE for 2019-nCoV RNA [OR] SARS-COV-2 RNA (COVID-19) RNA      We have been unable to reach Patient by phone at this time to notify of their Positive COVID-19 result.  Left voicemail message requesting a call back between 8 am to 6:30 p.m. to [phone] [facility] for results.   (Weekends, this line is available from 10A to 6:30P)     POSITIVE COVID-19 Letter Sent: NO     [Name]  Sebastian Villareal RN  [facility] Nurse Advisors

## 2020-10-05 ENCOUNTER — VIRTUAL VISIT (OUTPATIENT)
Dept: FAMILY MEDICINE | Facility: OTHER | Age: 35
End: 2020-10-05
Attending: NURSE PRACTITIONER
Payer: COMMERCIAL

## 2020-10-05 ENCOUNTER — TELEPHONE (OUTPATIENT)
Dept: EDUCATION SERVICES | Facility: OTHER | Age: 35
End: 2020-10-05

## 2020-10-05 VITALS — BODY MASS INDEX: 39.27 KG/M2 | WEIGHT: 230 LBS | HEIGHT: 64 IN

## 2020-10-05 DIAGNOSIS — U07.1 COVID-19: Primary | ICD-10-CM

## 2020-10-05 PROCEDURE — 99213 OFFICE O/P EST LOW 20 MIN: CPT | Mod: 95 | Performed by: NURSE PRACTITIONER

## 2020-10-05 RX ORDER — CODEINE PHOSPHATE AND GUAIFENESIN 10; 100 MG/5ML; MG/5ML
1-2 SOLUTION ORAL EVERY 6 HOURS PRN
Qty: 180 ML | Refills: 0 | Status: SHIPPED | OUTPATIENT
Start: 2020-10-05 | End: 2020-10-21

## 2020-10-05 ASSESSMENT — MIFFLIN-ST. JEOR: SCORE: 1733.27

## 2020-10-05 ASSESSMENT — PAIN SCALES - GENERAL: PAINLEVEL: NO PAIN (0)

## 2020-10-05 NOTE — PROGRESS NOTES
"Jaycee Campos is a 34 year old female who is being evaluated via a billable telephone visit.      The patient has been notified of following:     \"This telephone visit will be conducted via a call between you and your physician/provider. We have found that certain health care needs can be provided without the need for a physical exam.  This service lets us provide the care you need with a short phone conversation.  If a prescription is necessary we can send it directly to your pharmacy.  If lab work is needed we can place an order for that and you can then stop by our lab to have the test done at a later time.    Telephone visits are billed at different rates depending on your insurance coverage. During this emergency period, for some insurers they may be billed the same as an in-person visit.  Please reach out to your insurance provider with any questions.    If during the course of the call the physician/provider feels a telephone visit is not appropriate, you will not be charged for this service.\"    Patient has given verbal consent for Telephone visit?  Yes    What phone number would you like to be contacted at? [phone]    How would you like to obtain your AVS? Mail a copy    Subjective     Jaycee Campos is a 34 year old female who presents via phone visit today for the following health issues:    HPI     Acute Illness  Acute illness concerns: cough fever   Onset/Duration: last Saturday   Symptoms:  Fever: YES  Chills/Sweats: YES  Headache (location?): YES  Sinus Pressure: no  Conjunctivitis:  no  Ear Pain: YES- off and on   Rhinorrhea: no  Congestion: YES  Sore Throat: no  Cough: YES-non-productive  Wheeze: no  Decreased Appetite: no  Nausea: YES  Vomiting: no  Diarrhea: no  Dysuria/Freq.: no  Dysuria or Hematuria: no  Fatigue/Achiness: YES  Sick/Strep Exposure: YES- positive covid symptoms started September 26, 2020 with positive test on 9/28/2020.    Therapies tried and outcome: Dayquil and Nyquil " and vit C and D         Patient Active Problem List   Diagnosis     Morbid obesity (H)     Past Surgical History:   Procedure Laterality Date     GYN SURGERY         Social History     Tobacco Use     Smoking status: Never Smoker     Smokeless tobacco: Never Used   Substance Use Topics     Alcohol use: No     Alcohol/week: 0.0 standard drinks     Family History   Problem Relation Age of Onset     Coronary Artery Disease Father      Diabetes Maternal Grandmother      Hypertension Maternal Grandmother      Cerebrovascular Disease Maternal Grandmother      Diabetes Paternal Grandmother      Hypertension Paternal Grandmother      Mental Illness Sister      Substance Abuse Sister          Current Outpatient Medications   Medication Sig Dispense Refill     hydrOXYzine (VISTARIL) 50 MG capsule Take 1-2 capsules ( mg) by mouth nightly as needed for other (insomnia) 60 capsule 1     JUNEL 1.5/30 1.5-30 MG-MCG tablet TK 1 T PO QD  2     No Known Allergies  Recent Labs   Lab Test 09/02/20  1603 05/06/19  0940 04/25/18  0833 05/07/17  1110   * 202* 184*  --    HDL 64 62 59  --    TRIG 295* 111 171*  --    ALT  --   --   --  22   CR 0.58 0.65  --  0.53   GFRESTIMATED >90 >90  --  >90  Non  GFR Calc     GFRESTBLACK >90 >90  --  >90  African American GFR Calc     POTASSIUM 3.9 4.1  --  4.0   TSH 1.07 0.79  --   --       BP Readings from Last 3 Encounters:   09/02/20 128/78   02/28/20 128/72   11/08/19 138/82    Wt Readings from Last 3 Encounters:   10/05/20 104.3 kg (230 lb)   09/02/20 109.7 kg (241 lb 14.4 oz)   02/28/20 100.7 kg (222 lb 1.6 oz)                    Review of Systems   Constitutional, HEENT, cardiovascular, pulmonary, gi and gu systems are negative, except as otherwise noted.       Objective   Vitals - Patient Reported  Pain Score: No Pain (0)      Vitals:  No vitals were obtained today due to virtual visit.    alert and no distress  PSYCH: Alert and oriented times 3; coherent speech,  "normal   rate and volume, able to articulate logical thoughts, able   to abstract reason, no tangential thoughts, no hallucinations   or delusions  Her affect is normal and pleasant  RESP: No cough, no audible wheezing, able to talk in full sentences  Remainder of exam unable to be completed due to telephone visits                Assessment & Plan     1. COVID-19  Mucinex, OR day/quil nyquil.    Symptomatic cares  Self isolate until symptoms resolved.  reviewed current CDC guidelines.    - guaiFENesin-codeine (ROBITUSSIN AC) 100-10 MG/5ML solution; Take 5-10 mLs by mouth every 6 hours as needed  Dispense: 180 mL; Refill: 0       BMI:   Estimated body mass index is 39.48 kg/m  as calculated from the following:    Height as of this encounter: 1.626 m (5' 4\").    Weight as of this encounter: 104.3 kg (230 lb).          Follow-up as needed    Kita Cuellar NP  [facility] [place]    Phone call duration:  8 minutes              "

## 2020-10-05 NOTE — NURSING NOTE
"Chief Complaint   Patient presents with     Cough       Initial Ht 1.626 m (5' 4\")   Wt 104.3 kg (230 lb)   BMI 39.48 kg/m   Estimated body mass index is 39.48 kg/m  as calculated from the following:    Height as of this encounter: 1.626 m (5' 4\").    Weight as of this encounter: 104.3 kg (230 lb).  Medication Reconciliation: complete  Pamela M. Lechevalier, LPN    "

## 2020-10-05 NOTE — TELEPHONE ENCOUNTER
Patient is calling stating that she has tested positive for Covid and needs to reschedule her appointment on 10/6/2020. She was positive on 9/28/2020. Appointment needs to be two weeks after that, thank you.

## 2020-10-21 ENCOUNTER — VIRTUAL VISIT (OUTPATIENT)
Dept: FAMILY MEDICINE | Facility: OTHER | Age: 35
End: 2020-10-21
Attending: NURSE PRACTITIONER
Payer: COMMERCIAL

## 2020-10-21 VITALS — WEIGHT: 232 LBS | HEIGHT: 64 IN | BODY MASS INDEX: 39.61 KG/M2

## 2020-10-21 DIAGNOSIS — L30.9 DERMATITIS: Primary | ICD-10-CM

## 2020-10-21 PROCEDURE — 99213 OFFICE O/P EST LOW 20 MIN: CPT | Mod: 95 | Performed by: NURSE PRACTITIONER

## 2020-10-21 RX ORDER — TRIAMCINOLONE ACETONIDE 1 MG/G
CREAM TOPICAL 2 TIMES DAILY PRN
Qty: 80 G | Refills: 1 | Status: SHIPPED | OUTPATIENT
Start: 2020-10-21 | End: 2021-11-18

## 2020-10-21 ASSESSMENT — MIFFLIN-ST. JEOR: SCORE: 1742.35

## 2020-10-21 NOTE — NURSING NOTE
"Chief Complaint   Patient presents with     Derm Problem       Initial Ht 1.626 m (5' 4\")   Wt 105.2 kg (232 lb)   BMI 39.82 kg/m   Estimated body mass index is 39.82 kg/m  as calculated from the following:    Height as of this encounter: 1.626 m (5' 4\").    Weight as of this encounter: 105.2 kg (232 lb).  Medication Reconciliation: complete  Pamela M. Lechevalier, LPN    "

## 2020-10-21 NOTE — PROGRESS NOTES
"Jaycee Campos is a 34 year old female who is being evaluated via a billable telephone visit.      The patient has been notified of following:     \"This telephone visit will be conducted via a call between you and your physician/provider. We have found that certain health care needs can be provided without the need for a physical exam.  This service lets us provide the care you need with a short phone conversation.  If a prescription is necessary we can send it directly to your pharmacy.  If lab work is needed we can place an order for that and you can then stop by our lab to have the test done at a later time.    Telephone visits are billed at different rates depending on your insurance coverage. During this emergency period, for some insurers they may be billed the same as an in-person visit.  Please reach out to your insurance provider with any questions.    If during the course of the call the physician/provider feels a telephone visit is not appropriate, you will not be charged for this service.\"    Patient has given verbal consent for Telephone visit?  Yes    What phone number would you like to be contacted at? [phone]    How would you like to obtain your AVS? Darrellhart    Subjective     Jaycee Campos is a 34 year old female who presents via phone visit today for the following health issues:    HPI     Rash  Onset/Duration: 10/20/20  Description  Location: right hand  Character: dry looking but not dry   Itching: no  Intensity:  moderate  Progression of Symptoms:  worsening  Accompanying signs and symptoms:   Fever: no  Body aches or joint pain: no  Sore throat symptoms: no  Recent cold symptoms: YES- positive for covid 9/28/2020.  All symptoms resolved one week ago.    History:           Previous episodes of similar rash: states get very dry skin every fall and winter with the colder weather, worse this year.    New exposures:  None  Recent travel: no  Exposure to similar rash: no  Precipitating or " "alleviating factors: unknown   Therapies tried and outcome: lotion             Review of Systems   Constitutional, HEENT, cardiovascular, pulmonary, gi and gu systems are negative, except as otherwise noted.       Objective          Vitals:  No vitals were obtained today due to virtual visit.    alert and no distress  PSYCH: Alert and oriented times 3; coherent speech, normal   rate and volume, able to articulate logical thoughts, able   to abstract reason, no tangential thoughts, no hallucinations   or delusions  Her affect is normal and pleasant  RESP: No cough, no audible wheezing, able to talk in full sentences  Remainder of exam unable to be completed due to telephone visits    Pictures reviewed on My Chart message.             Assessment & Plan     1. Dermatitis  Follow-up if no improvement or any worsening.    - triamcinolone (KENALOG) 0.1 % external cream; Apply topically 2 times daily as needed (skin irritation)  Dispense: 80 g; Refill: 1       BMI:   Estimated body mass index is 39.82 kg/m  as calculated from the following:    Height as of this encounter: 1.626 m (5' 4\").    Weight as of this encounter: 105.2 kg (232 lb).        Follow-up as needed     Kita Cuellar NP  [facility]    Phone call duration:  9 minutes              "

## 2020-10-26 PROBLEM — E16.2 HYPOGLYCEMIA: Status: ACTIVE | Noted: 2020-10-26

## 2020-11-04 ENCOUNTER — ALLIED HEALTH/NURSE VISIT (OUTPATIENT)
Dept: EDUCATION SERVICES | Facility: OTHER | Age: 35
End: 2020-11-04
Attending: NURSE PRACTITIONER
Payer: COMMERCIAL

## 2020-11-04 VITALS
OXYGEN SATURATION: 98 % | BODY MASS INDEX: 40.35 KG/M2 | HEART RATE: 86 BPM | WEIGHT: 235.1 LBS | RESPIRATION RATE: 16 BRPM

## 2020-11-04 DIAGNOSIS — E16.2 HYPOGLYCEMIA: Primary | ICD-10-CM

## 2020-11-04 LAB — HBA1C MFR BLD: 5.5 % (ref 0–5.6)

## 2020-11-04 PROCEDURE — 95251 CONT GLUC MNTR ANALYSIS I&R: CPT | Performed by: NURSE PRACTITIONER

## 2020-11-04 PROCEDURE — 36416 COLLJ CAPILLARY BLOOD SPEC: CPT | Performed by: NURSE PRACTITIONER

## 2020-11-04 PROCEDURE — 99213 OFFICE O/P EST LOW 20 MIN: CPT | Mod: 25 | Performed by: NURSE PRACTITIONER

## 2020-11-04 PROCEDURE — 83036 HEMOGLOBIN GLYCOSYLATED A1C: CPT | Performed by: NURSE PRACTITIONER

## 2020-11-04 RX ORDER — AMOXICILLIN 500 MG
1200 CAPSULE ORAL 2 TIMES DAILY
Start: 2020-11-04 | End: 2024-04-11

## 2020-11-04 RX ORDER — MULTIVITAMIN
1 TABLET ORAL DAILY
Start: 2020-11-04 | End: 2024-05-24

## 2020-11-04 ASSESSMENT — ANXIETY QUESTIONNAIRES
6. BECOMING EASILY ANNOYED OR IRRITABLE: NOT AT ALL
4. TROUBLE RELAXING: NOT AT ALL
1. FEELING NERVOUS, ANXIOUS, OR ON EDGE: NOT AT ALL
GAD7 TOTAL SCORE: 0
5. BEING SO RESTLESS THAT IT IS HARD TO SIT STILL: NOT AT ALL
7. FEELING AFRAID AS IF SOMETHING AWFUL MIGHT HAPPEN: NOT AT ALL
IF YOU CHECKED OFF ANY PROBLEMS ON THIS QUESTIONNAIRE, HOW DIFFICULT HAVE THESE PROBLEMS MADE IT FOR YOU TO DO YOUR WORK, TAKE CARE OF THINGS AT HOME, OR GET ALONG WITH OTHER PEOPLE: NOT DIFFICULT AT ALL
3. WORRYING TOO MUCH ABOUT DIFFERENT THINGS: NOT AT ALL
2. NOT BEING ABLE TO STOP OR CONTROL WORRYING: NOT AT ALL

## 2020-11-04 ASSESSMENT — PATIENT HEALTH QUESTIONNAIRE - PHQ9: SUM OF ALL RESPONSES TO PHQ QUESTIONS 1-9: 2

## 2020-11-04 ASSESSMENT — PAIN SCALES - GENERAL: PAINLEVEL: NO PAIN (0)

## 2020-11-04 NOTE — PROGRESS NOTES
CHIEF COMPLAINT:  Chief Complaint   Patient presents with     Diabetes       SUBJECTIVE:  Jaycee Campos is an 34 year old female who presents for follow-up of hypoglycemia    Age at diagnosis: prediabetes (7th grade)--issues with cholesterol.   Family history positive for diabetes in the patient's : maternal grandmother and paternal grandfather.   Current treatment includes: diet, exercise and SMBG.     Current monitoring regimen: home blood tests - q 3 months  How often do you check your glucose levels: rarely; Fasting glucose range/average?   Rarely uses meter   Last HgbA1c: No results found for: A1C  Do you have symptoms of low blood sugar or hypoglycemia (sweating, shaky, weak, dizzy, blurred vision, confusion): :YES- BG 49  Are you taking your medications as prescribed: YES  Side effects from medications: no  Have you had any numbness or burning in feet?: no; Any sores?: no  Have you had your eyes examined by an eye doctor within the last year?: two years ago  Have you had a Dental Visit in the last year: YES  Diabetic complications: none  Any shortness of breath: no  Having any chest pain: no  Cardiovascular risk factors: family history and lipids  Increased/Abnormal swelling: no  Diabetes Education: yes  Daily aspirin: NO  Flu vaccine? When?: no  Pneumonia vaccine? When?: no  Last foot exam:  n/a  Any concerns?: no     OTHER HISTORY:  Nelly's here today for the follow up regarding her hypoglycemia.    No results found for: A1C  Current Diabetes medication:   none  ASA use: no  Statin use: no    Jaycee's here for her professional CGM study.    Issues with hypoglycemic symptoms.    Reports having a BG in the 40s.  Not sure what lead to it.         11/03/20 6373   OTHER   Informal Support system: Children;Family;Spouse   Diabetes type Other   Autonomic neuropathy No   Peripheral Vascular Disease No   Nephropathy No   Retinopathy No   Heart disease No   Peripheral neuropathy No   CVA No   Fatigue No    Polydipsia No   Polyphagia No   Polyuria No   Visual change No   Times checking blood sugar at home (number) Never   Meal planning/habits Calorie counting;Smaller portions   Blood glucose trend Other   Disease course Other   Diabetes education in the past 24mo No   Current Treatments None;Diet   Blood Glucose Meter Unknown   Meals include Lunch;Dinner   Beverages Water;Soda;Sports drinks   Barrier to exercise Physical limitation;Other   Has dilated eye exam at least once a year? No   Feet checked by healthcare provider in the last year? No   Slow healing wounds No   CAD Risks Diabetes Mellitus;Dyslipidemia;Family history;Obesity;Sedentary lifestyle;Stress   Sees dentist every 6 months? No   Sexual dysfunction No   Cultural/Congregation diet restrictions? No   Neuropathy No   Times checking blood sugar at home (per) Month   How many times a week on average do you eat food made away from home (restaurant/take-out)? 3       Current Outpatient Medications   Medication Sig Dispense Refill     hydrOXYzine (VISTARIL) 50 MG capsule Take 1-2 capsules ( mg) by mouth nightly as needed for other (insomnia) 60 capsule 1     JUNEL 1.5/30 1.5-30 MG-MCG tablet TK 1 T PO QD  2     multivitamin (ONE-DAILY) tablet Take 1 tablet by mouth daily       Omega-3 Fatty Acids (FISH OIL) 1200 MG capsule Take 1 capsule (1,200 mg) by mouth 2 times daily       triamcinolone (KENALOG) 0.1 % external cream Apply topically 2 times daily as needed (skin irritation) 80 g 1       No Known Allergies    Social History     Socioeconomic History     Marital status:      Spouse name: Not on file     Number of children: Not on file     Years of education: Not on file     Highest education level: Not on file   Occupational History     Not on file   Social Needs     Financial resource strain: Not on file     Food insecurity     Worry: Not on file     Inability: Not on file     Transportation needs     Medical: Not on file     Non-medical: Not on  file   Tobacco Use     Smoking status: Never Smoker     Smokeless tobacco: Never Used   Substance and Sexual Activity     Alcohol use: No     Alcohol/week: 0.0 standard drinks     Drug use: No     Sexual activity: Yes     Partners: Male     Birth control/protection: Pill   Lifestyle     Physical activity     Days per week: Not on file     Minutes per session: Not on file     Stress: Not on file   Relationships     Social connections     Talks on phone: Not on file     Gets together: Not on file     Attends Yarsani service: Not on file     Active member of club or organization: Not on file     Attends meetings of clubs or organizations: Not on file     Relationship status: Not on file     Intimate partner violence     Fear of current or ex partner: Not on file     Emotionally abused: Not on file     Physically abused: Not on file     Forced sexual activity: Not on file   Other Topics Concern     Parent/sibling w/ CABG, MI or angioplasty before 65F 55M? Not Asked   Social History Narrative     Not on file       Family History   Problem Relation Age of Onset     Coronary Artery Disease Father      Diabetes Maternal Grandmother      Hypertension Maternal Grandmother      Cerebrovascular Disease Maternal Grandmother      Diabetes Paternal Grandmother      Hypertension Paternal Grandmother      Mental Illness Sister      Substance Abuse Sister        Past Surgical History:   Procedure Laterality Date     GYN SURGERY         REVIEW OF SYSTEMS  Skin: rash on right hand--using triamcinolone  Eyes: negative  Ears/Nose/Throat: negative; recently had COVID (facial pain)  Respiratory: No shortness of breath, dyspnea on exertion, cough, or hemoptysis  Cardiovascular: negative  Gastrointestinal: hx of IBS  Genitourinary: negative  Musculoskeletal: negative  Neurologic: negative  Psychiatric: negative  Hematologic/Lymphatic/Immunologic: negative  Endocrine: prediabetes    OBJECTIVE:  Pulse 86   Resp 16   Wt 106.6 kg (235 lb 1.6  oz)   SpO2 98%   BMI 40.35 kg/m    Constitutional: healthy, alert and no distress  Respiratory:  Good diaphragmatic excursion.   Musculoskeletal: extremities normal- no gross deformities noted and gait normal  Skin: no suspicious lesions or rashes  Psychiatric: mentation appears normal and affect normal/bright      LABS AND IMAGING  Results for orders placed or performed in visit on 11/04/20   Hemoglobin A1c POCT     Status: None   Result Value Ref Range    Hemoglobin A1C 5.5 0 - 5.6 %       ASSESSMENT / PLAN:  (E16.2) Hypoglycemia  (primary encounter diagnosis)  Comment:   Noted the following for her CGM study:    Date: 9/16/2020 to 9/30/2020  Glucose management indicator: 5.6%    Average glucose: 95    Glucose range  Very low (<54): 0  low (<70): 3%  In target range (): 97%  High (>180): 0  Very high (>250): 0    Date: 9/16    Ave. Glucose: 84   Time in target: 85%  Time below target: 15%  Time above target: 0%    Date: 9/17    Ave. Glucose: 91   Time in target: 94%  Time below target: 6%  Time above target: 0%    Date: 9/18    Ave. Glucose: 99   Time in target: 97%  Time below target: 3%  Time above target: 0%    Date: 9/19    Ave. Glucose: 97   Time in target: 98%  Time below target: 2%  Time above target: 0%    Date: 9/20    Ave. Glucose: 99   Time in target: 100%  Time below target: 0%  Time above target: 0%    Date: 9/21    Ave. Glucose: 90   Time in target: 97%  Time below target: 3%  Time above target: 0%    Date: 9/22    Ave. Glucose: 96   Time in target: 99%  Time below target: 1%  Time above target: 0%    Date: 9/23    Ave. Glucose: 93   Time in target: 100%  Time below target: 0%  Time above target: 0%    Date: 9/24    Ave. Glucose: 94   Time in target: 100%  Time below target: 0%  Time above target: 0%    Date: 9/25    Ave. Glucose: 102   Time in target: 100%  Time below target: 0%  Time above target: 0%    Date: 9/26    Ave. Glucose: 93   Time in target: 86%  Time below target: 14%  Time above  target: 0%    Date: 9/27    Ave. Glucose: 95   Time in target: 100%  Time below target: 0%  Time above target: 0%    Date: 9/28    Ave. Glucose: 100   Time in target: 100%  Time below target: 0%  Time above target: 0%    Date: 9/29    Ave. Glucose: 89   Time in target: 98%  Time below target: 2%  Time above target: 0%    Date: 9/30    Ave. Glucose: 88   Time in target: 92%  Time below target: 8%  Time above target: 0%    Distribution Data:  Percentage above 180: 0%  Percentage within : 97%  Percentage below 70: 3%    Hypoglycemia incidence:  Noted 9 of 15 days sense SGs <70s.      Food choices/Carbohydrate counting:  Drinks regular pop  Not every meal contains protein    Exercise:  some    Recommendations:  Encouraged protein with EVERY meal/snack  If continues to have issues with low BG symptoms--verify with BG.  If low, might need an endocrinology referral    Plan: GLUCOSE MONITOR, 72 HOUR, PHYS INTERP          As stated above    Follow up Rudy Chu NP as needed      Patient Instructions   Continue working on healthy eating and moving (start low and slow, work up to 30 min, 5x/week)    Medication changes   none    Follow up   With Rudy Chu NP as needed    Call me sooner if any problems/concerns and/or questions develop including consistent low BGs <70 or consistent high BGs >200  [phone] (Unit Coordinator)    [phone] (Nurse)    Recommendations:  Add protein to meals        Time: 40 min  Barrier: none  Willingness to learn: accepting    Gisela Parry RN P-BC  Disease Management    Cc: Rudy Chu NP      -------------------------------------------------------------------------------------------------------------------   With the electronic record, we can now more quickly and easily track our patient diabetic goals. Our diabetes clinical review is in progress and these are the indicators we are monitoring for good diabetes health.     1.) HbA1C less than 7 (measurement of your average blood  sugars)  2.) Blood Pressure less than 140/80  3.) LDL less than 100 (bad cholesterol)  4.) HbA1C is checked in the last 6 months and below 7% (more frequently if not at goal or adjusting medications)  5.) LDL is checked in the last 12 months (more frequently if not at goal or adjusting medications)  6.) Taking one baby aspirin daily (unless otherwise instructed)  7.) No tobacco use  8) Statin use     You have achieved 7 out of 8 of these and I am encouraging you to come in and get tuned up to achieve 8 out of 8!  Here is what you have achieved so far in my goals for you:  1.) HbA1C  less than 7:                              YES       Last A1c  Lab Results   Component Value Date    A1C 5.5 11/04/2020       2.) Blood Pressure less than 140/80:       YES      Your last    BP Readings from Last 1 Encounters:   09/02/20 128/78     3.) LDL less than 100:                              NO   Your last     Lab Results   Component Value Date     09/02/2020     4.) Checked HbA1C in the past 6 months: YES      5.) Checked LDL in the past 12 months:    YES      6.) Taking one aspirin daily:                       NO--intentionally  7.) No tobacco use:                                        YES      8.) Statin use      NO--on fish oil

## 2020-11-04 NOTE — PATIENT INSTRUCTIONS
Continue working on healthy eating and moving (start low and slow, work up to 30 min, 5x/week)    Medication changes   none    Follow up   With Rudy Chu NP as needed    Call me sooner if any problems/concerns and/or questions develop including consistent low BGs <70 or consistent high BGs >200  [phone] (Unit Coordinator)    [phone] (Nurse)    Recommendations:  Add protein to meals

## 2020-11-05 ASSESSMENT — ANXIETY QUESTIONNAIRES: GAD7 TOTAL SCORE: 0

## 2020-12-14 ENCOUNTER — HEALTH MAINTENANCE LETTER (OUTPATIENT)
Age: 35
End: 2020-12-14

## 2021-02-27 ENCOUNTER — HEALTH MAINTENANCE LETTER (OUTPATIENT)
Age: 36
End: 2021-02-27

## 2021-06-12 ENCOUNTER — HEALTH MAINTENANCE LETTER (OUTPATIENT)
Age: 36
End: 2021-06-12

## 2021-10-02 ENCOUNTER — HEALTH MAINTENANCE LETTER (OUTPATIENT)
Age: 36
End: 2021-10-02

## 2021-11-13 NOTE — PROGRESS NOTES
"  Assessment & Plan     1. Hypoglycemia  - Hemoglobin A1c; Future  - TSH with free T4 reflex; Future  - Basic metabolic panel; Future    2. Morbid obesity (H)  Continue weight loss efforts     3. Family history of ischemic heart disease  - Lipid Profile; Future    4. Encounter for hepatitis C screening test for low risk patient  - Hepatitis C Screen Reflex to HCV RNA Quant and Genotype; Future      Review of prior external note(s) from - CareEverywhere information from diabetes education notes reviewed         BMI:   Estimated body mass index is 39.65 kg/m  as calculated from the following:    Height as of this encounter: 1.626 m (5' 4\").    Weight as of this encounter: 104.8 kg (231 lb).   Weight management plan: Discussed healthy diet and exercise guidelines    Will notify of results as they are returned.     Kita Cuellar NP  [facility] [place] CHARLES Champion is a 35 year old who presents for the following health issues     HPI     Hypoglycemia Follow-up      How often are you checking your blood sugar? Not at all    What concerns do you have today? None     Do you have any of these symptoms? (Select all that apply)  No numbness or tingling in feet.  No redness, sores or blisters on feet.  No complaints of excessive thirst.  No reports of blurry vision.  No significant changes to weight.    Have you had a diabetic eye exam in the last 12 months? No     Has not had any low glucose levels and no symptoms of hypoglycemia.          BP Readings from Last 2 Encounters:   11/18/21 122/84   09/02/20 128/78     Hemoglobin A1C (%)   Date Value   11/04/2020 5.5     LDL Cholesterol Calculated (mg/dL)   Date Value   09/02/2020 160 (H)   05/06/2019 202 (H)         How many servings of fruits and vegetables do you eat daily?  2-3    On average, how many sweetened beverages do you drink each day (Examples: soda, juice, sweet tea, etc.  Do NOT count diet or artificially sweetened beverages)?   " 1    How many days per week do you exercise enough to make your heart beat faster? 4    How many minutes a day do you exercise enough to make your heart beat faster? 30 - 60    How many days per week do you miss taking your medication? 0    She is feeling well, no new concerns.      Patient Active Problem List   Diagnosis     Morbid obesity (H)     Hypoglycemia     Abdominal pain, recurrent     Closed fracture of ankle     Encounter for supervision of normal pregnancy in multigravida     History of  section     Uterus, septate     Past Surgical History:   Procedure Laterality Date     GYN SURGERY         Social History     Tobacco Use     Smoking status: Never Smoker     Smokeless tobacco: Never Used   Substance Use Topics     Alcohol use: No     Alcohol/week: 0.0 standard drinks     Family History   Problem Relation Age of Onset     Coronary Artery Disease Father      Diabetes Maternal Grandmother      Hypertension Maternal Grandmother      Cerebrovascular Disease Maternal Grandmother      Diabetes Paternal Grandmother      Hypertension Paternal Grandmother      Mental Illness Sister      Substance Abuse Sister          Current Outpatient Medications   Medication Sig Dispense Refill     KURVELO 0.15-30 MG-MCG tablet Take 1 tablet by mouth daily       multivitamin (ONE-DAILY) tablet Take 1 tablet by mouth daily       Omega-3 Fatty Acids (FISH OIL) 1200 MG capsule Take 1 capsule (1,200 mg) by mouth 2 times daily       Vitamin D3 (CHOLECALCIFEROL) 25 mcg (1000 units) tablet        No Known Allergies  Recent Labs   Lab Test 20  0000 20  1603 19  0940 18  0833 17  1110   A1C 5.5  --   --   --   --    LDL  --  160* 202* 184*  --    HDL  --  64 62 59  --    TRIG  --  295* 111 171*  --    ALT  --   --   --   --  22   CR  --  0.58 0.65  --  0.53   GFRESTIMATED  --  >90 >90  --  >90  Non  GFR Calc     GFRESTBLACK  --  >90 >90  --  >90  African American GFR Calc    "  POTASSIUM  --  3.9 4.1  --  4.0   TSH  --  1.07 0.79  --   --       BP Readings from Last 3 Encounters:   11/18/21 122/84   09/02/20 128/78   02/28/20 128/72    Wt Readings from Last 3 Encounters:   11/18/21 104.8 kg (231 lb)   11/04/20 106.6 kg (235 lb 1.6 oz)   10/21/20 105.2 kg (232 lb)                      Review of Systems   Constitutional, HEENT, cardiovascular, pulmonary, gi and gu systems are negative, except as otherwise noted.      Objective    /84 (BP Location: Left arm, Patient Position: Chair, Cuff Size: Adult Large)   Pulse 77   Temp 98.4  F (36.9  C) (Tympanic)   Ht 1.626 m (5' 4\")   Wt 104.8 kg (231 lb)   SpO2 98%   Breastfeeding No   BMI 39.65 kg/m    Body mass index is 39.65 kg/m .  Physical Exam   GENERAL: healthy, alert and no distress, obese  NECK: no adenopathy, no asymmetry, masses, or scars, thyroid normal to palpation and no carotid bruits  RESP: lungs clear to auscultation - no rales, rhonchi or wheezes  CV: regular rate and rhythm, normal S1 S2, no S3 or S4, no murmur, click or rub, no peripheral edema and peripheral pulses strong  MS: no gross musculoskeletal defects noted, no edema  PSYCH: mentation appears normal, affect normal/bright                "

## 2021-11-18 ENCOUNTER — OFFICE VISIT (OUTPATIENT)
Dept: FAMILY MEDICINE | Facility: OTHER | Age: 36
End: 2021-11-18
Attending: NURSE PRACTITIONER
Payer: COMMERCIAL

## 2021-11-18 VITALS
HEIGHT: 64 IN | HEART RATE: 77 BPM | TEMPERATURE: 98.4 F | OXYGEN SATURATION: 98 % | DIASTOLIC BLOOD PRESSURE: 84 MMHG | SYSTOLIC BLOOD PRESSURE: 122 MMHG | WEIGHT: 231 LBS | BODY MASS INDEX: 39.44 KG/M2

## 2021-11-18 DIAGNOSIS — E16.2 HYPOGLYCEMIA: Primary | ICD-10-CM

## 2021-11-18 DIAGNOSIS — Z11.59 ENCOUNTER FOR HEPATITIS C SCREENING TEST FOR LOW RISK PATIENT: ICD-10-CM

## 2021-11-18 DIAGNOSIS — E66.01 MORBID OBESITY (H): ICD-10-CM

## 2021-11-18 DIAGNOSIS — Z82.49 FAMILY HISTORY OF ISCHEMIC HEART DISEASE: ICD-10-CM

## 2021-11-18 LAB
ANION GAP SERPL CALCULATED.3IONS-SCNC: 5 MMOL/L (ref 3–14)
BUN SERPL-MCNC: 10 MG/DL (ref 7–30)
CALCIUM SERPL-MCNC: 9 MG/DL (ref 8.5–10.1)
CHLORIDE BLD-SCNC: 108 MMOL/L (ref 94–109)
CHOLEST SERPL-MCNC: 311 MG/DL
CO2 SERPL-SCNC: 25 MMOL/L (ref 20–32)
CREAT SERPL-MCNC: 0.7 MG/DL (ref 0.52–1.04)
EST. AVERAGE GLUCOSE BLD GHB EST-MCNC: 117 MG/DL
FASTING STATUS PATIENT QL REPORTED: YES
GFR SERPL CREATININE-BSD FRML MDRD: >90 ML/MIN/1.73M2
GLUCOSE BLD-MCNC: 96 MG/DL (ref 70–99)
HBA1C MFR BLD: 5.7 % (ref 0–5.6)
HDLC SERPL-MCNC: 61 MG/DL
LDLC SERPL CALC-MCNC: 220 MG/DL
NONHDLC SERPL-MCNC: 250 MG/DL
POTASSIUM BLD-SCNC: 4.4 MMOL/L (ref 3.4–5.3)
SODIUM SERPL-SCNC: 138 MMOL/L (ref 133–144)
TRIGL SERPL-MCNC: 151 MG/DL
TSH SERPL DL<=0.005 MIU/L-ACNC: 0.66 MU/L (ref 0.4–4)

## 2021-11-18 PROCEDURE — 99213 OFFICE O/P EST LOW 20 MIN: CPT | Performed by: NURSE PRACTITIONER

## 2021-11-18 PROCEDURE — 84443 ASSAY THYROID STIM HORMONE: CPT | Performed by: NURSE PRACTITIONER

## 2021-11-18 PROCEDURE — 86803 HEPATITIS C AB TEST: CPT | Performed by: NURSE PRACTITIONER

## 2021-11-18 PROCEDURE — 80061 LIPID PANEL: CPT | Performed by: NURSE PRACTITIONER

## 2021-11-18 PROCEDURE — 36415 COLL VENOUS BLD VENIPUNCTURE: CPT | Performed by: NURSE PRACTITIONER

## 2021-11-18 PROCEDURE — 80048 BASIC METABOLIC PNL TOTAL CA: CPT | Performed by: NURSE PRACTITIONER

## 2021-11-18 PROCEDURE — 83036 HEMOGLOBIN GLYCOSYLATED A1C: CPT | Performed by: NURSE PRACTITIONER

## 2021-11-18 RX ORDER — VITAMIN B COMPLEX
TABLET ORAL
COMMUNITY
Start: 2020-11-01 | End: 2024-04-11

## 2021-11-18 RX ORDER — LEVONORGESTREL AND ETHINYL ESTRADIOL 0.15-0.03
1 KIT ORAL DAILY
COMMUNITY
Start: 2021-10-11

## 2021-11-18 ASSESSMENT — MIFFLIN-ST. JEOR: SCORE: 1732.81

## 2021-11-18 ASSESSMENT — PAIN SCALES - GENERAL: PAINLEVEL: NO PAIN (0)

## 2021-11-18 NOTE — PATIENT INSTRUCTIONS
"  Assessment & Plan     1. Hypoglycemia  - Hemoglobin A1c; Future  - TSH with free T4 reflex; Future  - Basic metabolic panel; Future    2. Morbid obesity (H)  Continue weight loss efforts     3. Family history of ischemic heart disease  - Lipid Profile; Future    4. Encounter for hepatitis C screening test for low risk patient  - Hepatitis C Screen Reflex to HCV RNA Quant and Genotype; Future      Review of prior external note(s) from - CareEverywhere information from diabetes education notes reviewed         BMI:   Estimated body mass index is 39.65 kg/m  as calculated from the following:    Height as of this encounter: 1.626 m (5' 4\").    Weight as of this encounter: 104.8 kg (231 lb).   Weight management plan: Discussed healthy diet and exercise guidelines    Will notify of results as they are returned.     Kita Cuellar NP  [facility]    "

## 2021-11-18 NOTE — NURSING NOTE
"Chief Complaint   Patient presents with     Diabetes       Initial /84 (BP Location: Left arm, Patient Position: Chair, Cuff Size: Adult Large)   Pulse 77   Temp 98.4  F (36.9  C) (Tympanic)   Ht 1.626 m (5' 4\")   Wt 104.8 kg (231 lb)   SpO2 98%   BMI 39.65 kg/m   Estimated body mass index is 39.65 kg/m  as calculated from the following:    Height as of this encounter: 1.626 m (5' 4\").    Weight as of this encounter: 104.8 kg (231 lb).  Medication Reconciliation: complete  Arlyn Cowart LPN    "

## 2021-11-19 DIAGNOSIS — E78.5 HYPERLIPIDEMIA LDL GOAL <100: Primary | ICD-10-CM

## 2021-11-19 LAB — HCV AB SERPL QL IA: NONREACTIVE

## 2021-11-19 RX ORDER — ATORVASTATIN CALCIUM 10 MG/1
10 TABLET, FILM COATED ORAL AT BEDTIME
Qty: 90 TABLET | Refills: 1 | Status: SHIPPED | OUTPATIENT
Start: 2021-11-19 | End: 2022-03-11

## 2022-01-22 ENCOUNTER — HEALTH MAINTENANCE LETTER (OUTPATIENT)
Age: 37
End: 2022-01-22

## 2022-02-14 ENCOUNTER — MYC MEDICAL ADVICE (OUTPATIENT)
Dept: OTHER | Facility: HOSPITAL | Age: 37
End: 2022-02-14
Payer: COMMERCIAL

## 2022-03-11 ENCOUNTER — MYC MEDICAL ADVICE (OUTPATIENT)
Dept: FAMILY MEDICINE | Facility: OTHER | Age: 37
End: 2022-03-11
Payer: COMMERCIAL

## 2022-03-11 DIAGNOSIS — E78.5 HYPERLIPIDEMIA LDL GOAL <100: ICD-10-CM

## 2022-03-11 RX ORDER — ATORVASTATIN CALCIUM 10 MG/1
10 TABLET, FILM COATED ORAL AT BEDTIME
Qty: 90 TABLET | Refills: 1 | Status: SHIPPED | OUTPATIENT
Start: 2022-03-11 | End: 2022-10-20

## 2022-09-03 ENCOUNTER — HEALTH MAINTENANCE LETTER (OUTPATIENT)
Age: 37
End: 2022-09-03

## 2022-09-26 ENCOUNTER — E-VISIT (OUTPATIENT)
Dept: URGENT CARE | Facility: CLINIC | Age: 37
End: 2022-09-26

## 2022-09-26 DIAGNOSIS — N39.0 ACUTE UTI (URINARY TRACT INFECTION): Primary | ICD-10-CM

## 2022-09-26 PROCEDURE — 99421 OL DIG E/M SVC 5-10 MIN: CPT | Performed by: FAMILY MEDICINE

## 2022-09-26 RX ORDER — SULFAMETHOXAZOLE/TRIMETHOPRIM 800-160 MG
1 TABLET ORAL 2 TIMES DAILY
Qty: 6 TABLET | Refills: 0 | Status: SHIPPED | OUTPATIENT
Start: 2022-09-26 | End: 2022-09-29

## 2022-09-26 NOTE — PATIENT INSTRUCTIONS
Dear Jaycee Campos    After reviewing your responses, I've been able to diagnose you with a urinary tract infection, which is a common infection of the bladder with bacteria.  This is not a sexually transmitted infection, though urinating immediately after intercourse can help prevent infections.  Drinking lots of fluids is also helpful to clear your current infection and prevent the next one.      I have sent a prescription for antibiotics to your pharmacy to treat this infection.    It is important that you take all of your prescribed medication even if your symptoms are improving after a few doses.  Taking all of your medicine helps prevent the symptoms from returning.     If your symptoms worsen, you develop pain in your back or stomach, develop fevers, or are not improving in 5 days, please contact your primary care provider for an appointment or visit any of our convenient Walk-in or Urgent Care Centers to be seen, which can be found on our website here.    Thanks again for choosing us as your health care partner,    Aby Reyes MD    Urinary Tract Infections in Women  Urinary tract infections (UTIs) are most often caused by bacteria. These bacteria enter the urinary tract. The bacteria may come from inside the body. Or they may travel from the skin outside the rectum or vagina into the urethra. Female anatomy makes it easy for bacteria from the bowel to enter a woman s urinary tract, which is the most common source of UTI. This means women develop UTIs more often than men. Pain in or around the urinary tract is a common UTI symptom. But the only way to know for sure if you have a UTI for the healthcare provider to test your urine. The two tests that may be done are the urinalysis and urine culture.     Types of UTIs    Cystitis. A bladder infection (cystitis) is the most common UTI in women. You may have urgent or frequent need to pee. You may also have pain, burning when you pee, and bloody  urine.    Urethritis. This is an inflamed urethra, which is the tube that carries urine from the bladder to outside the body. You may have lower stomach or back pain. You may also have urgent or frequent need to pee.    Pyelonephritis. This is a kidney infection. If not treated, it can be serious and damage your kidneys. In severe cases, you may need to stay in the hospital. You may have a fever and lower back pain.    Medicines to treat a UTI  Most UTIs are treated with antibiotics. These kill the bacteria. The length of time you need to take them depends on the type of infection. It may be as short as 3 days. If you have repeated UTIs, you may need a low-dose antibiotic for several months. Take antibiotics exactly as directed. Don t stop taking them until all of the medicine is gone. If you stop taking the antibiotic too soon, the infection may not go away. You may also develop a resistance to the antibiotic. This can make it much harder to treat.   Lifestyle changes to treat and prevent UTIs   The lifestyle changes below will help get rid of your UTI. They may also help prevent future UTIs.     Drink plenty of fluids. This includes water, juice, or other caffeine-free drinks. Fluids help flush bacteria out of your body.    Empty your bladder. Always empty your bladder when you feel the urge to pee. And always pee before going to sleep. Urine that stays in your bladder can lead to infection. Try to pee before and after sex as well.    Practice good personal hygiene. Wipe yourself from front to back after using the toilet. This helps keep bacteria from getting into the urethra.    Use condoms during sex. These help prevent UTIs caused by sexually transmitted bacteria. Also don't use spermicides during sex. These can increase the risk for UTIs. Choose other forms of birth control instead. For women who tend to get UTIs after sex, a low-dose of a preventive antibiotic may be used. Be sure to discuss this option with  your healthcare provider.    Follow up with your healthcare provider as directed. He or she may test to make sure the infection has cleared. If needed, more treatment may be started.  Damaris last reviewed this educational content on 7/1/2019 2000-2021 [employer]. All rights reserved. This information is not intended as a substitute for professional medical care. Always follow your healthcare professional's instructions.

## 2022-10-18 DIAGNOSIS — E78.5 HYPERLIPIDEMIA LDL GOAL <100: ICD-10-CM

## 2022-10-20 RX ORDER — ATORVASTATIN CALCIUM 10 MG/1
TABLET, FILM COATED ORAL
Qty: 90 TABLET | Refills: 0 | Status: SHIPPED | OUTPATIENT
Start: 2022-10-20 | End: 2023-01-18

## 2023-01-16 DIAGNOSIS — E78.5 HYPERLIPIDEMIA LDL GOAL <100: ICD-10-CM

## 2023-01-18 RX ORDER — ATORVASTATIN CALCIUM 10 MG/1
TABLET, FILM COATED ORAL
Qty: 90 TABLET | Refills: 0 | Status: SHIPPED | OUTPATIENT
Start: 2023-01-18 | End: 2023-04-24

## 2023-01-18 NOTE — TELEPHONE ENCOUNTER
atorvastatin (LIPITOR) 10 MG tablet   Last Written Prescription Date:  10-  Last Fill Quantity: 90,   # refills: 0  Last Office Visit: 11-  Future Office visit:       Routing refill request to provider for review/approval because:  Drug not on the [facility], [facility] or [facility] refill protocol or controlled substance

## 2023-04-29 ENCOUNTER — HEALTH MAINTENANCE LETTER (OUTPATIENT)
Age: 38
End: 2023-04-29

## 2023-11-01 ENCOUNTER — TELEPHONE (OUTPATIENT)
Dept: FAMILY MEDICINE | Facility: OTHER | Age: 38
End: 2023-11-01

## 2023-11-01 ENCOUNTER — TRANSFERRED RECORDS (OUTPATIENT)
Dept: MULTI SPECIALTY CLINIC | Facility: CLINIC | Age: 38
End: 2023-11-01

## 2023-11-01 LAB — PAP SMEAR - HIM PATIENT REPORTED: NEGATIVE

## 2023-11-01 NOTE — TELEPHONE ENCOUNTER
1:23 PM    Reason for Call: OVERBOOK    Patient is having the following symptoms: Having heartburn and stomach burning she had this feeling before and she is thinking this is h pylori and she has weird taste in her month like bile but she doesn't have any acid coming up    Patient was offered this afternoon she's not able to do that appointment today she would like to be seen either on Thursday or Friday of this week.    The patient is requesting an appointment for this week with Rudy Cuellar.    Was an appointment offered for this call? No  If yes : Appointment type              Date    Preferred method for responding to this message: Telephone Call  What is your phone number ? [phone]    If we cannot reach you directly, may we leave a detailed response at the number you provided? Yes    Can this message wait until your PCP/provider returns, if unavailable today? No,

## 2023-11-02 NOTE — PROGRESS NOTES
Assessment & Plan     1. Hyperlipidemia LDL goal <100  Continue current pan   - Lipid Profile; Future  - Comprehensive metabolic panel; Future  - TSH with free T4 reflex; Future  - atorvastatin (LIPITOR) 10 MG tablet; Take 1 tablet (10 mg) by mouth at bedtime  Dispense: 90 tablet; Refill: 1    2. Gastroesophageal reflux disease without esophagitis  New onset  - omeprazole (PRILOSEC) 20 MG DR capsule; Take 1 capsule (20 mg) by mouth daily  Dispense: 90 capsule; Refill: 1  - Helicobacter pylori Antigen Stool; Future    3. Class 3 severe obesity due to excess calories with serious comorbidity and body mass index (BMI) of 40.0 to 44.9 in adult (H)  Weight loss encouraged     4. On statin therapy  - Comprehensive metabolic panel; Future    5. Diarrhea, unspecified type  - Helicobacter pylori Antigen Stool; Future    6. Newly recognized heart murmur  Will obtain echo for further assessment  - Echocardiogram Complete; Future      Will notify of results as they are returned  Follow-up in 6 months or as needed     Kita Cuellar, NP  [facility] [place] [facility] Champion is a 37 year old, presenting for the following health issues:  Gastrophageal Reflux      HPI     GERD/Heartburn  Onset/Duration: 4/2023  Description: heartburn, with pain under RT breast, epigastric pain radiates to mid back intermittently   Intensity: severe  Progression of Symptoms: worsening  Accompanying Signs & Symptoms:  Does it feel like food gets stuck or trouble swallowing: No  Nausea: No  Vomiting (bloody?): No  Abdominal Pain: YES- epigastric  Black-Tarry stools: No  Bloody stools: No  History:  Previous similar episodes: gallbladder symptoms x 2 years ago   Previous ulcers: No  Precipitating factors:   Caffeine use: YES  Alcohol use: No  NSAID/Aspirin use: No  Tobacco use: No  Worse with no particular food or drink.  Alleviating factors: tums with some relief   Therapies tried and outcome:             Lifestyle  changes: None            Medications: benjamin selzer, tums     History of H pylori, this feels similar.        Hyperlipidemia Follow-Up    Are you regularly taking any medication or supplement to lower your cholesterol?   Yes- lipitor  Are you having muscle aches or other side effects that you think could be caused by your cholesterol lowering medication?  No      Recent Labs   Lab Test 11/18/21  0853 11/04/20  0000 09/02/20  1603 05/06/19  0940 04/25/18  0833 05/07/17  1110   A1C 5.7* 5.5  --   --   --   --    *  --  160* 202*   < >  --    HDL 61  --  64 62   < >  --    TRIG 151*  --  295* 111   < >  --    ALT  --   --   --   --   --  22   CR 0.70  --  0.58 0.65  --  0.53   GFRESTIMATED >90  --  >90 >90  --  >90  Non  GFR Calc     GFRESTBLACK  --   --  >90 >90  --  >90  African American GFR Calc     POTASSIUM 4.4  --  3.9 4.1  --  4.0   TSH 0.66  --  1.07 0.79   < >  --     < > = values in this interval not displayed.      BP Readings from Last 3 Encounters:   11/03/23 124/72   11/18/21 122/84   09/02/20 128/78    Wt Readings from Last 3 Encounters:   11/03/23 112 kg (247 lb)   11/18/21 104.8 kg (231 lb)   11/04/20 106.6 kg (235 lb 1.6 oz)                      Review of Systems   Constitutional, HEENT, cardiovascular, pulmonary, gi and gu systems are negative, except as otherwise noted.      Objective    /72 (BP Location: Left arm, Patient Position: Sitting, Cuff Size: Adult Large)   Pulse 70   Temp 98.6  F (37  C) (Tympanic)   Resp 20   Wt 112 kg (247 lb)   SpO2 100%   BMI 42.40 kg/m    Body mass index is 42.4 kg/m .  Physical Exam   GENERAL: healthy, alert and no distress  NECK: no adenopathy, no asymmetry, masses, or scars and thyroid normal to palpation  RESP: lungs clear to auscultation - no rales, rhonchi or wheezes  CV: regular rates and rhythm and grade 3/6 systolic murmur   MS: no gross musculoskeletal defects noted, no edema  PSYCH: mentation appears normal, affect  normal/bright

## 2023-11-03 ENCOUNTER — OFFICE VISIT (OUTPATIENT)
Dept: FAMILY MEDICINE | Facility: OTHER | Age: 38
End: 2023-11-03
Attending: NURSE PRACTITIONER
Payer: COMMERCIAL

## 2023-11-03 VITALS
RESPIRATION RATE: 20 BRPM | TEMPERATURE: 98.6 F | OXYGEN SATURATION: 100 % | DIASTOLIC BLOOD PRESSURE: 72 MMHG | HEART RATE: 70 BPM | SYSTOLIC BLOOD PRESSURE: 124 MMHG | WEIGHT: 247 LBS | BODY MASS INDEX: 42.4 KG/M2

## 2023-11-03 DIAGNOSIS — R01.1 NEWLY RECOGNIZED HEART MURMUR: ICD-10-CM

## 2023-11-03 DIAGNOSIS — K21.9 GASTROESOPHAGEAL REFLUX DISEASE WITHOUT ESOPHAGITIS: ICD-10-CM

## 2023-11-03 DIAGNOSIS — Z79.899 ON STATIN THERAPY: ICD-10-CM

## 2023-11-03 DIAGNOSIS — E66.813 CLASS 3 SEVERE OBESITY DUE TO EXCESS CALORIES WITH SERIOUS COMORBIDITY AND BODY MASS INDEX (BMI) OF 40.0 TO 44.9 IN ADULT (H): ICD-10-CM

## 2023-11-03 DIAGNOSIS — R19.7 DIARRHEA, UNSPECIFIED TYPE: ICD-10-CM

## 2023-11-03 DIAGNOSIS — E66.01 CLASS 3 SEVERE OBESITY DUE TO EXCESS CALORIES WITH SERIOUS COMORBIDITY AND BODY MASS INDEX (BMI) OF 40.0 TO 44.9 IN ADULT (H): ICD-10-CM

## 2023-11-03 DIAGNOSIS — E78.5 HYPERLIPIDEMIA LDL GOAL <100: Primary | ICD-10-CM

## 2023-11-03 LAB
ALBUMIN SERPL BCG-MCNC: 4.3 G/DL (ref 3.5–5.2)
ALP SERPL-CCNC: 79 U/L (ref 35–104)
ALT SERPL W P-5'-P-CCNC: 24 U/L (ref 0–50)
ANION GAP SERPL CALCULATED.3IONS-SCNC: 14 MMOL/L (ref 7–15)
AST SERPL W P-5'-P-CCNC: 19 U/L (ref 0–45)
BILIRUB SERPL-MCNC: 0.2 MG/DL
BUN SERPL-MCNC: 8.6 MG/DL (ref 6–20)
CALCIUM SERPL-MCNC: 9.6 MG/DL (ref 8.6–10)
CHLORIDE SERPL-SCNC: 104 MMOL/L (ref 98–107)
CHOLEST SERPL-MCNC: 204 MG/DL
CREAT SERPL-MCNC: 0.65 MG/DL (ref 0.51–0.95)
DEPRECATED HCO3 PLAS-SCNC: 22 MMOL/L (ref 22–29)
EGFRCR SERPLBLD CKD-EPI 2021: >90 ML/MIN/1.73M2
GLUCOSE SERPL-MCNC: 101 MG/DL (ref 70–99)
HDLC SERPL-MCNC: 55 MG/DL
HOLD SPECIMEN: NORMAL
LDLC SERPL CALC-MCNC: 117 MG/DL
NONHDLC SERPL-MCNC: 149 MG/DL
POTASSIUM SERPL-SCNC: 4.1 MMOL/L (ref 3.4–5.3)
PROT SERPL-MCNC: 7.3 G/DL (ref 6.4–8.3)
SODIUM SERPL-SCNC: 140 MMOL/L (ref 135–145)
TRIGL SERPL-MCNC: 158 MG/DL
TSH SERPL DL<=0.005 MIU/L-ACNC: 1.14 UIU/ML (ref 0.3–4.2)

## 2023-11-03 PROCEDURE — 80053 COMPREHEN METABOLIC PANEL: CPT | Performed by: NURSE PRACTITIONER

## 2023-11-03 PROCEDURE — 99214 OFFICE O/P EST MOD 30 MIN: CPT | Performed by: NURSE PRACTITIONER

## 2023-11-03 PROCEDURE — 84443 ASSAY THYROID STIM HORMONE: CPT | Performed by: NURSE PRACTITIONER

## 2023-11-03 PROCEDURE — 36415 COLL VENOUS BLD VENIPUNCTURE: CPT | Performed by: NURSE PRACTITIONER

## 2023-11-03 PROCEDURE — 80061 LIPID PANEL: CPT | Performed by: NURSE PRACTITIONER

## 2023-11-03 RX ORDER — ATORVASTATIN CALCIUM 10 MG/1
10 TABLET, FILM COATED ORAL AT BEDTIME
Qty: 90 TABLET | Refills: 1 | Status: SHIPPED | OUTPATIENT
Start: 2023-11-03 | End: 2023-11-09

## 2023-11-03 ASSESSMENT — PAIN SCALES - GENERAL: PAINLEVEL: SEVERE PAIN (7)

## 2023-11-06 PROCEDURE — 87338 HPYLORI STOOL AG IA: CPT | Performed by: NURSE PRACTITIONER

## 2023-11-07 ENCOUNTER — APPOINTMENT (OUTPATIENT)
Dept: LAB | Facility: OTHER | Age: 38
End: 2023-11-07
Payer: COMMERCIAL

## 2023-11-08 LAB — H PYLORI AG STL QL IA: NEGATIVE

## 2023-11-08 NOTE — ED NOTES
Ambulated into UC with family. C/O possible broken toe. Left pinky toe. S/S bruising, pain occurs when toe moves towards the left along with swelling. Incident happened last night while she was shutting the door. Pain rating 5 out of 10. No medications prior to visit.       Assessment:   Gia is a sweet 15 year old girl with generalized epilepsy and spacing out/staring of unclear etiology (behavioral vs epileptic) admitted for video EEG monitoring. She continued to have breakthrough seizures on Levetiracetam and so far tolerating valproic acid well. She is admitted for evaluation of interictal abnormalities, subclinical seizures, capture of events as well as safe adjustment of medication with plan to optimize valproic acid while slowly tapering levetiracetam.      Plan VEE) Initiate continuous video-EEG monitoring, evaluate for interictal abnormalities, subclinical seizures, capture of spacingo out/staring, as well as safe adjustment of medication   2) Hyperventilation, and photic stimulation daily   3) CBC, CMP, Levetricatem and valproic acid leves, iron studies (ferritin, transferrin, TIBC)   4)  Continue Generic extended release Valproic acid 750 mg QPM (likely increase tomorrow based on EEG results)  5) Pending EEG results overnight will determine continuing dose of levetiracetam  6) Continue Folic acid 1 mg daily  7) Seizure/fall precautions    8) PRN intranasal Midazolam 5 mg for seizure over 3 minutes. May repeat an additional 5 mg dose 3 minutes after the first dose if seizure still active.

## 2023-11-09 ENCOUNTER — HOSPITAL ENCOUNTER (OUTPATIENT)
Dept: CARDIOLOGY | Facility: HOSPITAL | Age: 38
Discharge: HOME | End: 2023-11-09
Attending: NURSE PRACTITIONER | Admitting: INTERNAL MEDICINE
Payer: COMMERCIAL

## 2023-11-09 DIAGNOSIS — E78.5 HYPERLIPIDEMIA LDL GOAL <100: ICD-10-CM

## 2023-11-09 DIAGNOSIS — R01.1 NEWLY RECOGNIZED HEART MURMUR: ICD-10-CM

## 2023-11-09 LAB — LVEF ECHO: NORMAL

## 2023-11-09 PROCEDURE — 93306 TTE W/DOPPLER COMPLETE: CPT | Mod: 26 | Performed by: INTERNAL MEDICINE

## 2023-11-09 PROCEDURE — 93306 TTE W/DOPPLER COMPLETE: CPT

## 2023-11-09 RX ORDER — ATORVASTATIN CALCIUM 20 MG/1
20 TABLET, FILM COATED ORAL AT BEDTIME
Qty: 90 TABLET | Refills: 1 | Status: SHIPPED | OUTPATIENT
Start: 2023-11-09 | End: 2024-05-06

## 2023-11-15 ENCOUNTER — MYC MEDICAL ADVICE (OUTPATIENT)
Dept: FAMILY MEDICINE | Facility: OTHER | Age: 38
End: 2023-11-15

## 2023-11-17 NOTE — TELEPHONE ENCOUNTER
Yes, she can try a bland diet, can refer to dietitian if she would like.    I would give a few weeks or so.

## 2024-04-08 ENCOUNTER — MYC MEDICAL ADVICE (OUTPATIENT)
Dept: FAMILY MEDICINE | Facility: OTHER | Age: 39
End: 2024-04-08

## 2024-04-09 NOTE — TELEPHONE ENCOUNTER
4/9/2024 10:25 AM  Pt called reports constant dull ache with occasional increases in a pain for a brief time. Pt notified if she ever experiences moderate to severe pain go to the ER immediately and/or if ever needing immediate medical attention she should go to the ER immediately. Go to ER if sx worsen or change. Pt verbalizes understanding and agrees to plan.     At this time, pt rates pain as 1 on 0-10 pain scale. Pain located at RUQ. Occasionally pt report pain will radiate to shoulder, but denies this pain at this time. Pt denies fever. Raudel any other sx or concerns. Sx started about 1-2 weeks ago.   Denies nausea, denies vomiting, denies constipation & denies diarrhea at this time. Does report hx of IBS.   Reports hx of GERD.     Pt scheduled with covering provider on Thursday. Pt notified PCP is out of the office, but  message will be sent for PCP to review tomorrow.    Future Appointments 4/9/2024 - 10/6/2024        Date Visit Type Length Department Provider     4/11/2024  2:30 PM OFFICE VISIT 30 min [facility] Yogesh Mariscal DO    Location Instructions:     From [place] - follow [address]nbsp; Take a right at the intersection across from [employer].&nbsp; The clinic will be on your right.  From [place] - follow [address]nbsp; Take a right onto [address]nbsp; Take a left at the intersection across from [employer].&nbsp; The clinic will be on your right.  From [place] - follow [address]nbsp; Take a left onto [address].&nbsp; Take a left at the intersection across from [employer].&nbsp; The clinic will be on your right.              5/6/2024 11:30 AM SHORT 30 min [facility] Kita Cuellar NP    Location Instructions:     From [place] - follow [address]nbsp; Take a right at the intersection across from [employer].&nbsp; The clinic will be on your right.  From [place] - follow [address]nbsp; Take a right onto [address].&nbsp; Take a left at the intersection across from [employer].&nbsp; The clinic will be on your right.  From [place] - follow [address]nbsp; Take a left onto [address].&nbsp; Take a left at the intersection across from [employer].&nbsp; The clinic will be on your right.                      Jasmyne Vo, RN

## 2024-04-10 NOTE — PROGRESS NOTES
Indigestion- RUQ Discomfort      Assessment & Plan   Problem List Items Addressed This Visit    None  Visit Diagnoses       RUQ abdominal pain    -  Primary    Relevant Orders    CBC with platelets and differential    Comprehensive metabolic panel (BMP + Alb, Alk Phos, ALT, AST, Total. Bili, TP)    Lipase    US Abdomen Limited    Indigestion        Relevant Orders    CBC with platelets and differential    Comprehensive metabolic panel (BMP + Alb, Alk Phos, ALT, AST, Total. Bili, TP)    Lipase    US Abdomen Limited           Patient will proceed with the above stated testing.  If no significant findings consideration as to HIDA scan may be warranted.    15 minutes spent by me on the date of the encounter doing chart review, review of test results, interpretation of tests, patient visit, and documentation            Subjective   Jaycee is a 38 year old, presenting for the following health issues:  Abdominal Pain (Indigestion )    History of Present Illness       Reason for visit:  Gallbladder problem    She eats 2-3 servings of fruits and vegetables daily.She consumes 0 sweetened beverage(s) daily.She exercises with enough effort to increase her heart rate 30 to 60 minutes per day.  She exercises with enough effort to increase her heart rate 4 days per week.   She is taking medications regularly.     Jaycee presents today with several years of waxing waning right upper quadrant pain.  She states that the pain radiates from the right upper quadrant right under her right rib cage into her right flank up into her right axilla and right shoulder blade.  She states that the pain waxes and wanes throughout the day.  She denies any association with eating or the type of food she has eaten.  She states that it can come on in the middle of the night or the middle day on an empty stomach or even after eating.  She denies any significant nausea denies any vomiting with this.  She states that the symptoms have become worse over  "the course of the years.  She did have a history of reflux and states that her omeprazole has helped with that however now she has episodes where she experiences a bile taste in her mouth.    GERD/Heartburn  Onset/Duration: waxing and wanning over the last few years   Description: pain RUQ intermittently - radiates into mid on RT into RT shoulder. Patient reports tasting \"bile\" with reflux.   Intensity: moderate- severe  Progression of Symptoms: waxing and waning  Accompanying Signs & Symptoms:  Does it feel like food gets stuck or trouble swallowing: No  Nausea: No  Vomiting (bloody?): No  Abdominal Pain: YES- RUQ   Black-Tarry stools: No  Bloody stools: No  History:  Previous similar episodes: YES- waxing and waning over the last few years   Previous ulcers: No  Precipitating factors:   Caffeine use: YES- 1-2 cans of pop / day   Alcohol use: No  NSAID/Aspirin use: YES- intermittently   Tobacco use: No  Worse with no particular food or drink.  Alleviating factors: applying heat   Therapies tried and outcome:             Lifestyle changes: None            Medications: Omeprazole (Prilosec)          Review of Systems  Constitutional, HEENT, cardiovascular, pulmonary, gi and gu systems are negative, except as otherwise noted.      Objective    /70 (BP Location: Left arm, Patient Position: Sitting, Cuff Size: Adult Large)   Pulse 82   Temp 99.2  F (37.3  C) (Tympanic)   Resp 18   Wt 112 kg (247 lb)   SpO2 98%   BMI 42.40 kg/m    Body mass index is 42.4 kg/m .  Physical Exam   GENERAL: alert and no distress  EYES: Eyes grossly normal to inspection, PERRL and conjunctivae and sclerae normal  RESP: lungs clear to auscultation - no rales, rhonchi or wheezes  CV: regular rate and rhythm, normal S1 S2, no S3 or S4, no murmur, click or rub, no peripheral edema  ABDOMEN: + bowel sounds normal right upper quadrant tenderness reported however not worsened with palpation.  PSYCH: mentation appears normal, affect " normal/[facility] Out[facility] Visit on 11/09/2023   Component Date Value Ref Range Status    LVEF  11/09/2023 60-65%   Final     Results for orders placed or performed in visit on 04/11/24   CBC with platelets and differential     Status: None ()    Narrative    The following orders were created for panel order CBC with platelets and differential.  Procedure                               Abnormality         Status                     ---------                               -----------         ------                     CBC with platelets and d...[[id number]]                                                   Please view results for these tests on the individual orders.     Results for orders placed or performed in visit on 04/11/24 (from the past 24 hour(s))   CBC with platelets and differential    Narrative    The following orders were created for panel order CBC with platelets and differential.  Procedure                               Abnormality         Status                     ---------                               -----------         ------                     CBC with platelets and d...[[id number]]                                                   Please view results for these tests on the individual orders.           Signed Electronically by: Yogesh Mariscal,

## 2024-04-11 ENCOUNTER — OFFICE VISIT (OUTPATIENT)
Dept: INTERNAL MEDICINE | Facility: OTHER | Age: 39
End: 2024-04-11
Attending: INTERNAL MEDICINE
Payer: COMMERCIAL

## 2024-04-11 VITALS
RESPIRATION RATE: 18 BRPM | HEART RATE: 82 BPM | SYSTOLIC BLOOD PRESSURE: 124 MMHG | BODY MASS INDEX: 42.4 KG/M2 | TEMPERATURE: 99.2 F | WEIGHT: 247 LBS | DIASTOLIC BLOOD PRESSURE: 70 MMHG | OXYGEN SATURATION: 98 %

## 2024-04-11 DIAGNOSIS — R10.11 RUQ ABDOMINAL PAIN: Primary | ICD-10-CM

## 2024-04-11 DIAGNOSIS — K30 INDIGESTION: ICD-10-CM

## 2024-04-11 LAB
ALBUMIN SERPL BCG-MCNC: 4.2 G/DL (ref 3.5–5.2)
ALP SERPL-CCNC: 81 U/L (ref 40–150)
ALT SERPL W P-5'-P-CCNC: 25 U/L (ref 0–50)
ANION GAP SERPL CALCULATED.3IONS-SCNC: 15 MMOL/L (ref 7–15)
AST SERPL W P-5'-P-CCNC: 22 U/L (ref 0–45)
BASOPHILS # BLD AUTO: 0 10E3/UL (ref 0–0.2)
BASOPHILS NFR BLD AUTO: 0 %
BILIRUB SERPL-MCNC: 0.3 MG/DL
BUN SERPL-MCNC: 10.1 MG/DL (ref 6–20)
CALCIUM SERPL-MCNC: 9.1 MG/DL (ref 8.6–10)
CHLORIDE SERPL-SCNC: 103 MMOL/L (ref 98–107)
CREAT SERPL-MCNC: 0.61 MG/DL (ref 0.51–0.95)
DEPRECATED HCO3 PLAS-SCNC: 22 MMOL/L (ref 22–29)
EGFRCR SERPLBLD CKD-EPI 2021: >90 ML/MIN/1.73M2
EOSINOPHIL # BLD AUTO: 0 10E3/UL (ref 0–0.7)
EOSINOPHIL NFR BLD AUTO: 0 %
ERYTHROCYTE [DISTWIDTH] IN BLOOD BY AUTOMATED COUNT: 13.2 % (ref 10–15)
GLUCOSE SERPL-MCNC: 93 MG/DL (ref 70–99)
HCT VFR BLD AUTO: 40.3 %
HGB BLD-MCNC: 13.1 G/DL
IMM GRANULOCYTES # BLD: 0 10E3/UL
IMM GRANULOCYTES NFR BLD: 0 %
LIPASE SERPL-CCNC: 16 U/L (ref 13–60)
LYMPHOCYTES # BLD AUTO: 2.7 10E3/UL (ref 0.8–5.3)
LYMPHOCYTES NFR BLD AUTO: 33 %
MCH RBC QN AUTO: 27.7 PG (ref 26.5–33)
MCHC RBC AUTO-ENTMCNC: 32.5 G/DL (ref 31.5–36.5)
MCV RBC AUTO: 85 FL (ref 78–100)
MONOCYTES # BLD AUTO: 0.6 10E3/UL (ref 0–1.3)
MONOCYTES NFR BLD AUTO: 7 %
NEUTROPHILS # BLD AUTO: 4.9 10E3/UL (ref 1.6–8.3)
NEUTROPHILS NFR BLD AUTO: 60 %
PLATELET # BLD AUTO: 361 10E3/UL (ref 150–450)
POTASSIUM SERPL-SCNC: 4.1 MMOL/L (ref 3.4–5.3)
PROT SERPL-MCNC: 7.1 G/DL (ref 6.4–8.3)
RBC # BLD AUTO: 4.73 10E6/UL
SODIUM SERPL-SCNC: 140 MMOL/L (ref 135–145)
WBC # BLD AUTO: 8.2 10E3/UL (ref 4–11)

## 2024-04-11 PROCEDURE — 99213 OFFICE O/P EST LOW 20 MIN: CPT | Performed by: INTERNAL MEDICINE

## 2024-04-11 PROCEDURE — 83690 ASSAY OF LIPASE: CPT | Performed by: INTERNAL MEDICINE

## 2024-04-11 PROCEDURE — 36415 COLL VENOUS BLD VENIPUNCTURE: CPT | Performed by: INTERNAL MEDICINE

## 2024-04-11 PROCEDURE — 80053 COMPREHEN METABOLIC PANEL: CPT | Performed by: INTERNAL MEDICINE

## 2024-04-11 PROCEDURE — 85025 COMPLETE CBC W/AUTO DIFF WBC: CPT | Performed by: INTERNAL MEDICINE

## 2024-04-11 ASSESSMENT — PAIN SCALES - GENERAL: PAINLEVEL: MILD PAIN (2)

## 2024-04-12 ENCOUNTER — HOSPITAL ENCOUNTER (OUTPATIENT)
Dept: ULTRASOUND IMAGING | Facility: HOSPITAL | Age: 39
Discharge: HOME | End: 2024-04-12
Attending: INTERNAL MEDICINE | Admitting: INTERNAL MEDICINE
Payer: COMMERCIAL

## 2024-04-12 DIAGNOSIS — R10.11 RUQ ABDOMINAL PAIN: ICD-10-CM

## 2024-04-12 DIAGNOSIS — K30 INDIGESTION: ICD-10-CM

## 2024-04-12 PROCEDURE — 76705 ECHO EXAM OF ABDOMEN: CPT

## 2024-04-15 DIAGNOSIS — R10.11 RUQ ABDOMINAL PAIN: Primary | ICD-10-CM

## 2024-04-16 ENCOUNTER — OFFICE VISIT (OUTPATIENT)
Dept: SURGERY | Facility: OTHER | Age: 39
End: 2024-04-16
Attending: SURGERY
Payer: COMMERCIAL

## 2024-04-16 ENCOUNTER — PREP FOR PROCEDURE (OUTPATIENT)
Dept: SURGERY | Facility: OTHER | Age: 39
End: 2024-04-16

## 2024-04-16 VITALS
HEIGHT: 64 IN | OXYGEN SATURATION: 99 % | RESPIRATION RATE: 20 BRPM | SYSTOLIC BLOOD PRESSURE: 136 MMHG | DIASTOLIC BLOOD PRESSURE: 90 MMHG | WEIGHT: 242 LBS | HEART RATE: 83 BPM | BODY MASS INDEX: 41.32 KG/M2

## 2024-04-16 DIAGNOSIS — K80.20 CALCULUS OF GALLBLADDER WITHOUT CHOLECYSTITIS WITHOUT OBSTRUCTION: ICD-10-CM

## 2024-04-16 DIAGNOSIS — R10.11 RUQ ABDOMINAL PAIN: Primary | ICD-10-CM

## 2024-04-16 DIAGNOSIS — K80.20 CHOLELITHIASIS: Primary | ICD-10-CM

## 2024-04-16 PROCEDURE — 99213 OFFICE O/P EST LOW 20 MIN: CPT | Performed by: SURGERY

## 2024-04-16 ASSESSMENT — PAIN SCALES - GENERAL: PAINLEVEL: NO PAIN (0)

## 2024-04-16 NOTE — PATIENT INSTRUCTIONS
Thank you for allowing Dr. Braun and our surgical team to participate in your care. Please call our health unit coordinator at [phone] with scheduling questions or the nurse at [phone] with any other questions or concerns.    You have been scheduled for: laparoscopic cholecystectomy with  on 6/3/24.   Please see handout for additional instruction.  You will need a pre-operative appointment with your primary care provider.  You may call [phone] or [phone] with any questions.

## 2024-04-16 NOTE — PROGRESS NOTES
"CLINIC NOTE - CONSULT  4/16/2024    Patient : Jaycee Campos  Referring Physician : Yogesh Mariscal    Reason for Referral : Cholelithiasis    This is a 38 year old female with Cholelithiasis.     Date of onset : months   Fatty food intolerance :  All food bother her   History of jaundice : NO   History of Pancreatitis : NO      Ultrasound shows cholelithiasis : YES    Evidence of choledocholithiasis : NO    Past Medical History:  History reviewed. No pertinent past medical history.    Past Surgical History:  Past Surgical History:   Procedure Laterality Date    GYN SURGERY         Family History History:  Family History   Problem Relation Age of Onset    Coronary Artery Disease Father     Diabetes Maternal Grandmother     Hypertension Maternal Grandmother     Cerebrovascular Disease Maternal Grandmother     Diabetes Paternal Grandmother     Hypertension Paternal Grandmother     Mental Illness Sister     Substance Abuse Sister        History of Tobacco Use:  History   Smoking Status    Never   Smokeless Tobacco    Never       Current Medications:  Current Outpatient Medications   Medication Sig Dispense Refill    atorvastatin (LIPITOR) 20 MG tablet Take 1 tablet (20 mg) by mouth at bedtime 90 tablet 1    KURVELO 0.15-30 MG-MCG tablet Take 1 tablet by mouth daily      multivitamin (ONE-DAILY) tablet Take 1 tablet by mouth daily      omeprazole (PRILOSEC) 20 MG DR capsule Take 1 capsule (20 mg) by mouth daily 90 capsule 1       Allergies:  No Known Allergies    ROS:  Pertinent items are noted in HPI.  All other systems are negative.    PHYSICAL EXAM:     Vital signs: BP (!) 136/90 (BP Location: Left arm, Cuff Size: Adult Regular)   Pulse 83   Resp 20   Ht 1.626 m (5' 4\")   Wt 109.8 kg (242 lb)   SpO2 99%   BMI 41.54 kg/m     Weight: [unfilled]   BMI: Body mass index is 41.54 kg/m .   General: Normal, cooperative, in no acute distress, alert, morbidly obese   Skin: no jaundice   HEENT: PERRLA and EOMI   Neck: " supple     LABORATORY:  CBC RESULTS:   Recent Labs   Lab Test 04/11/24  1216   WBC 8.2   RBC 4.73   HGB 13.1   HCT 40.3   MCV 85   MCH 27.7   MCHC 32.5   RDW 13.2          Last Comprehensive Metabolic Panel:  Sodium   Date Value Ref Range Status   04/11/2024 140 135 - 145 mmol/L Final     Comment:     Reference intervals for this test were updated on 09/26/2023 to more accurately reflect our healthy population. There may be differences in the flagging of prior results with similar values performed with this method. Interpretation of those prior results can be made in the context of the updated reference intervals.    09/02/2020 136 133 - 144 mmol/L Final     Potassium   Date Value Ref Range Status   04/11/2024 4.1 3.4 - 5.3 mmol/L Final   11/18/2021 4.4 3.4 - 5.3 mmol/L Final   09/02/2020 3.9 3.4 - 5.3 mmol/L Final     Chloride   Date Value Ref Range Status   04/11/2024 103 98 - 107 mmol/L Final   11/18/2021 108 94 - 109 mmol/L Final   09/02/2020 106 94 - 109 mmol/L Final     Carbon Dioxide   Date Value Ref Range Status   09/02/2020 23 20 - 32 mmol/L Final     Carbon Dioxide (CO2)   Date Value Ref Range Status   04/11/2024 22 22 - 29 mmol/L Final   11/18/2021 25 20 - 32 mmol/L Final     Anion Gap   Date Value Ref Range Status   04/11/2024 15 7 - 15 mmol/L Final   11/18/2021 5 3 - 14 mmol/L Final   09/02/2020 7 3 - 14 mmol/L Final     Glucose   Date Value Ref Range Status   04/11/2024 93 70 - 99 mg/dL Final   11/18/2021 96 70 - 99 mg/dL Final   09/02/2020 85 70 - 99 mg/dL Final     Comment:     Non Fasting     Urea Nitrogen   Date Value Ref Range Status   04/11/2024 10.1 6.0 - 20.0 mg/dL Final   11/18/2021 10 7 - 30 mg/dL Final   09/02/2020 8 7 - 30 mg/dL Final     Creatinine   Date Value Ref Range Status   04/11/2024 0.61 0.51 - 0.95 mg/dL Final   09/02/2020 0.58 0.52 - 1.04 mg/dL Final     GFR Estimate   Date Value Ref Range Status   04/11/2024 >90 >60 mL/min/1.73m2 Final   09/02/2020 >90 >60  mL/min/[1.73_m2] Final     Comment:     Non  GFR Calc  Starting 12/18/2018, serum creatinine based estimated GFR (eGFR) will be   calculated using the Chronic Kidney Disease Epidemiology Collaboration   (CKD-EPI) equation.       Calcium   Date Value Ref Range Status   04/11/2024 9.1 8.6 - 10.0 mg/dL Final   09/02/2020 9.4 8.5 - 10.1 mg/dL Final     Bilirubin Total   Date Value Ref Range Status   04/11/2024 0.3 <=1.2 mg/dL Final   05/07/2017 0.3 0.2 - 1.3 mg/dL Final     Alkaline Phosphatase   Date Value Ref Range Status   04/11/2024 81 40 - 150 U/L Final     Comment:     Reference intervals for this test were updated on 11/14/2023 to more accurately reflect our healthy population. There may be differences in the flagging of prior results with similar values performed with this method. Interpretation of those prior results can be made in the context of the updated reference intervals.   05/07/2017 117 40 - 150 U/L Final     ALT   Date Value Ref Range Status   04/11/2024 25 0 - 50 U/L Final     Comment:     Reference intervals for this test were updated on 6/12/2023 to more accurately reflect our healthy population. There may be differences in the flagging of prior results with similar values performed with this method. Interpretation of those prior results can be made in the context of the updated reference intervals.     05/07/2017 22 0 - 50 U/L Final     AST   Date Value Ref Range Status   04/11/2024 22 0 - 45 U/L Final     Comment:     Reference intervals for this test were updated on 6/12/2023 to more accurately reflect our healthy population. There may be differences in the flagging of prior results with similar values performed with this method. Interpretation of those prior results can be made in the context of the updated reference intervals.   05/07/2017 10 0 - 45 U/L Final       ASSESSMENT:    38 year old female with Cholelithiasis.        PLAN:   Will plan on a laparoscopic cholecystectomy  possible open procedure.  The procedure was explained with its risk, benefits and alternatives.  Risks include but are not limited to conversion to an open procedure, wound infections, development of an incisional hernia, damager to internal organs, damage to the common bile duct, need for additional procedures.  All of the patients questions were answered.  Patient consents to proceed.  The procedure will be scheduled.     Preoperative physical is needed : YES

## 2024-05-03 NOTE — PROGRESS NOTES
"Assessment & Plan     1. Hyperlipidemia LDL goal <100  Continue current plan   - atorvastatin (LIPITOR) 20 MG tablet; Take 1 tablet (20 mg) by mouth at bedtime  Dispense: 90 tablet; Refill: 1  - Lipid Profile; Future  - TSH with free T4 reflex; Future  - CBC with Platelets & Differential; Future    2. Gastroesophageal reflux disease without esophagitis  - omeprazole (PRILOSEC) 20 MG DR capsule; Take 1 capsule (20 mg) by mouth daily  Dispense: 90 capsule; Refill: 1  - Comprehensive metabolic panel; Future    3. Class 3 severe obesity due to excess calories with serious comorbidity and body mass index (BMI) of 40.0 to 44.9 in adult (H)  Weight loss encouraged     4. Calculus of gallbladder without cholecystitis without obstruction  IM notes and ultrasound reviewed     5. Dysfunction of right eustachian tube  Start zyrtec or claritin or similar product.    - ciprofloxacin-dexAMETHasone (CIPRODEX) 0.3-0.1 % otic suspension; Place 4 drops into the right ear 2 times daily for 7 days  Dispense: 7.5 mL; Refill: 0    BMI  Estimated body mass index is 43.2 kg/m  as calculated from the following:    Height as of this encounter: 1.626 m (5' 4\").    Weight as of this encounter: 114.2 kg (251 lb 11.2 oz).   Weight management plan: Discussed healthy diet and exercise guidelines    Elevated blood pressure - will recheck next week at he pre-op      The longitudinal plan of care for the diagnosis(es)/condition(s) as documented were addressed during this visit. Due to the added complexity in care, I will continue to support Jaycee in the subsequent management and with ongoing continuity of care.    Kita Cuellar,   Certified Adult Nurse Practitioner  [phone]      Subjective   Jaycee is a 38 year old, presenting for the following health issues:  Lipids and Gastrophageal Reflux        5/6/2024    11:12 AM   Additional Questions   Roomed by valentina   Accompanied by none     Via the Health Maintenance questionnaire, the patient " has reported the following services have been completed -Cervical Cancer Screening, this information has been sent to the abstraction team.  HPI     Hyperlipidemia Follow-Up    Are you regularly taking any medication or supplement to lower your cholesterol?   Yes- Lipitor 20 mg  Are you having muscle aches or other side effects that you think could be caused by your cholesterol lowering medication?  No      GERD/Heartburn  Onset/Duration: many years   Description: acid reflux   Intensity: mild  Progression of Symptoms: improving  Accompanying Signs & Symptoms:  Does it feel like food gets stuck or trouble swallowing: No  Nausea: No  Vomiting (bloody?): No  Abdominal Pain: No  Black-Tarry stools: No  Bloody stools: No  History:  Previous similar episodes: YES  Previous ulcers: No  Precipitating factors:   Caffeine use: YES  Alcohol use: YES  NSAID/Aspirin use: YES  Tobacco use: No  Worse with no particular food or drink.  Alleviating factors: omeprazole.  She saw Dr Mariscal who ordered ultrasound - gallstones noted.  She is scheduled for cholecystectomy in June.    Therapies tried and outcome:             Lifestyle changes: None            Medications: Omeprazole (Prilosec)    Concern - right ear pain   Onset: saturday   Description:   Ear pain   Intensity: mild  Progression of Symptoms:  same  Accompanying Signs & Symptoms:  none  Previous history of similar problem:   no  Precipitating factors:   Worsened by: na   Alleviating factors:  Improved by: nothing     Therapies Tried and outcome: none        Recent Labs   Lab Test 04/11/24  1216 11/03/23  0957 11/18/21  0853 11/18/21  0853 11/04/20  0000 09/02/20  1603 05/06/19  0940 04/25/18  0833 05/07/17  1110   A1C  --   --   --  5.7* 5.5  --   --   --   --    LDL  --  117*  --  220*  --  160* 202*   < >  --    HDL  --  55  --  61  --  64 62   < >  --    TRIG  --  158*  --  151*  --  295* 111   < >  --    ALT 25 24  --   --   --   --   --   --  22   CR 0.61 0.65    "< > 0.70  --  0.58 0.65  --  0.53   GFRESTIMATED >90 >90   < > >90  --  >90 >90  --  >90  Non  GFR Calc     GFRESTBLACK  --   --   --   --   --  >90 >90  --  >90  African American GFR Calc     POTASSIUM 4.1 4.1  --  4.4  --  3.9 4.1  --  4.0   TSH  --  1.14  --  0.66  --  1.07 0.79   < >  --     < > = values in this interval not displayed.      BP Readings from Last 3 Encounters:   05/06/24 138/86   04/16/24 (!) 136/90   04/11/24 124/70    Wt Readings from Last 3 Encounters:   05/06/24 114.2 kg (251 lb 11.2 oz)   04/16/24 109.8 kg (242 lb)   04/11/24 112 kg (247 lb)                   Review of Systems  Constitutional, neuro, ENT, endocrine, pulmonary, cardiac, gastrointestinal, genitourinary, musculoskeletal, integument and psychiatric systems are negative, except as otherwise noted.      Objective    /86 (BP Location: Right arm, Patient Position: Chair, Cuff Size: Adult Large)   Pulse 75   Temp 98.6  F (37  C) (Tympanic)   Resp 16   Ht 1.626 m (5' 4\")   Wt 114.2 kg (251 lb 11.2 oz)   LMP 05/01/2024 (Exact Date)   SpO2 99%   BMI 43.20 kg/m    Body mass index is 43.2 kg/m .  Physical Exam   GENERAL: alert and no distress  NECK: no adenopathy, no asymmetry, masses, or scars, thyroid normal to palpation, and no carotid bruits  RESP: lungs clear to auscultation - no rales, rhonchi or wheezes  CV: regular rate and rhythm, normal S1 S2, no S3 or S4, no murmur  MS: no gross musculoskeletal defects noted, no edema  PSYCH: mentation appears normal, affect normal/bright            Signed Electronically by: Kita Cuellar NP    "

## 2024-05-06 ENCOUNTER — OFFICE VISIT (OUTPATIENT)
Dept: FAMILY MEDICINE | Facility: OTHER | Age: 39
End: 2024-05-06
Attending: NURSE PRACTITIONER
Payer: COMMERCIAL

## 2024-05-06 VITALS
HEART RATE: 75 BPM | RESPIRATION RATE: 16 BRPM | WEIGHT: 251.7 LBS | TEMPERATURE: 98.6 F | HEIGHT: 64 IN | DIASTOLIC BLOOD PRESSURE: 86 MMHG | BODY MASS INDEX: 42.97 KG/M2 | OXYGEN SATURATION: 99 % | SYSTOLIC BLOOD PRESSURE: 138 MMHG

## 2024-05-06 DIAGNOSIS — K80.20 CALCULUS OF GALLBLADDER WITHOUT CHOLECYSTITIS WITHOUT OBSTRUCTION: ICD-10-CM

## 2024-05-06 DIAGNOSIS — E66.01 CLASS 3 SEVERE OBESITY DUE TO EXCESS CALORIES WITH SERIOUS COMORBIDITY AND BODY MASS INDEX (BMI) OF 40.0 TO 44.9 IN ADULT (H): ICD-10-CM

## 2024-05-06 DIAGNOSIS — H69.91 DYSFUNCTION OF RIGHT EUSTACHIAN TUBE: ICD-10-CM

## 2024-05-06 DIAGNOSIS — K21.9 GASTROESOPHAGEAL REFLUX DISEASE WITHOUT ESOPHAGITIS: ICD-10-CM

## 2024-05-06 DIAGNOSIS — E66.813 CLASS 3 SEVERE OBESITY DUE TO EXCESS CALORIES WITH SERIOUS COMORBIDITY AND BODY MASS INDEX (BMI) OF 40.0 TO 44.9 IN ADULT (H): ICD-10-CM

## 2024-05-06 DIAGNOSIS — E78.5 HYPERLIPIDEMIA LDL GOAL <100: Primary | ICD-10-CM

## 2024-05-06 LAB
ALBUMIN SERPL BCG-MCNC: 4.1 G/DL (ref 3.5–5.2)
ALP SERPL-CCNC: 89 U/L (ref 40–150)
ALT SERPL W P-5'-P-CCNC: 30 U/L (ref 0–50)
ANION GAP SERPL CALCULATED.3IONS-SCNC: 12 MMOL/L (ref 7–15)
AST SERPL W P-5'-P-CCNC: 22 U/L (ref 0–45)
BASOPHILS # BLD AUTO: 0 10E3/UL (ref 0–0.2)
BASOPHILS NFR BLD AUTO: 0 %
BILIRUB SERPL-MCNC: 0.3 MG/DL
BUN SERPL-MCNC: 8.4 MG/DL (ref 6–20)
CALCIUM SERPL-MCNC: 9.2 MG/DL (ref 8.6–10)
CHLORIDE SERPL-SCNC: 103 MMOL/L (ref 98–107)
CHOLEST SERPL-MCNC: 192 MG/DL
CREAT SERPL-MCNC: 0.62 MG/DL (ref 0.51–0.95)
DEPRECATED HCO3 PLAS-SCNC: 23 MMOL/L (ref 22–29)
EGFRCR SERPLBLD CKD-EPI 2021: >90 ML/MIN/1.73M2
EOSINOPHIL # BLD AUTO: 0 10E3/UL (ref 0–0.7)
EOSINOPHIL NFR BLD AUTO: 0 %
ERYTHROCYTE [DISTWIDTH] IN BLOOD BY AUTOMATED COUNT: 12.9 % (ref 10–15)
FASTING STATUS PATIENT QL REPORTED: NO
GLUCOSE SERPL-MCNC: 86 MG/DL (ref 70–99)
HCT VFR BLD AUTO: 41.9 % (ref 35–47)
HDLC SERPL-MCNC: 54 MG/DL
HGB BLD-MCNC: 13.5 G/DL (ref 11.7–15.7)
IMM GRANULOCYTES # BLD: 0 10E3/UL
IMM GRANULOCYTES NFR BLD: 0 %
LDLC SERPL CALC-MCNC: 110 MG/DL
LYMPHOCYTES # BLD AUTO: 2.6 10E3/UL (ref 0.8–5.3)
LYMPHOCYTES NFR BLD AUTO: 32 %
MCH RBC QN AUTO: 27.7 PG (ref 26.5–33)
MCHC RBC AUTO-ENTMCNC: 32.2 G/DL (ref 31.5–36.5)
MCV RBC AUTO: 86 FL (ref 78–100)
MONOCYTES # BLD AUTO: 0.6 10E3/UL (ref 0–1.3)
MONOCYTES NFR BLD AUTO: 7 %
NEUTROPHILS # BLD AUTO: 4.9 10E3/UL (ref 1.6–8.3)
NEUTROPHILS NFR BLD AUTO: 60 %
NONHDLC SERPL-MCNC: 138 MG/DL
PLATELET # BLD AUTO: 369 10E3/UL (ref 150–450)
POTASSIUM SERPL-SCNC: 4.2 MMOL/L (ref 3.4–5.3)
PROT SERPL-MCNC: 7.5 G/DL (ref 6.4–8.3)
RBC # BLD AUTO: 4.88 10E6/UL (ref 3.8–5.2)
SODIUM SERPL-SCNC: 138 MMOL/L (ref 135–145)
TRIGL SERPL-MCNC: 140 MG/DL
TSH SERPL DL<=0.005 MIU/L-ACNC: 0.63 UIU/ML (ref 0.3–4.2)
WBC # BLD AUTO: 8.1 10E3/UL (ref 4–11)

## 2024-05-06 PROCEDURE — 80050 GENERAL HEALTH PANEL: CPT | Performed by: NURSE PRACTITIONER

## 2024-05-06 PROCEDURE — 80061 LIPID PANEL: CPT | Performed by: NURSE PRACTITIONER

## 2024-05-06 PROCEDURE — 99214 OFFICE O/P EST MOD 30 MIN: CPT | Performed by: NURSE PRACTITIONER

## 2024-05-06 PROCEDURE — G2211 COMPLEX E/M VISIT ADD ON: HCPCS | Performed by: NURSE PRACTITIONER

## 2024-05-06 PROCEDURE — 36415 COLL VENOUS BLD VENIPUNCTURE: CPT | Performed by: NURSE PRACTITIONER

## 2024-05-06 RX ORDER — ATORVASTATIN CALCIUM 20 MG/1
20 TABLET, FILM COATED ORAL AT BEDTIME
Qty: 90 TABLET | Refills: 1 | Status: SHIPPED | OUTPATIENT
Start: 2024-05-06

## 2024-05-06 RX ORDER — CIPROFLOXACIN AND DEXAMETHASONE 3; 1 MG/ML; MG/ML
4 SUSPENSION/ DROPS AURICULAR (OTIC) 2 TIMES DAILY
Qty: 7.5 ML | Refills: 0 | Status: SHIPPED | OUTPATIENT
Start: 2024-05-06 | End: 2024-05-13

## 2024-05-06 ASSESSMENT — PAIN SCALES - GENERAL: PAINLEVEL: NO PAIN (0)

## 2024-05-16 ENCOUNTER — OFFICE VISIT (OUTPATIENT)
Dept: FAMILY MEDICINE | Facility: OTHER | Age: 39
End: 2024-05-16
Attending: NURSE PRACTITIONER
Payer: COMMERCIAL

## 2024-05-16 VITALS
WEIGHT: 250.5 LBS | BODY MASS INDEX: 43 KG/M2 | HEART RATE: 76 BPM | OXYGEN SATURATION: 97 % | SYSTOLIC BLOOD PRESSURE: 139 MMHG | DIASTOLIC BLOOD PRESSURE: 84 MMHG | TEMPERATURE: 98.8 F | RESPIRATION RATE: 16 BRPM

## 2024-05-16 DIAGNOSIS — E78.5 HYPERLIPIDEMIA LDL GOAL <100: ICD-10-CM

## 2024-05-16 DIAGNOSIS — Z01.818 PRE-OPERATIVE EXAMINATION: Primary | ICD-10-CM

## 2024-05-16 DIAGNOSIS — K21.9 GASTROESOPHAGEAL REFLUX DISEASE WITHOUT ESOPHAGITIS: ICD-10-CM

## 2024-05-16 DIAGNOSIS — E66.01 CLASS 3 SEVERE OBESITY DUE TO EXCESS CALORIES WITH SERIOUS COMORBIDITY AND BODY MASS INDEX (BMI) OF 40.0 TO 44.9 IN ADULT (H): ICD-10-CM

## 2024-05-16 DIAGNOSIS — E66.813 CLASS 3 SEVERE OBESITY DUE TO EXCESS CALORIES WITH SERIOUS COMORBIDITY AND BODY MASS INDEX (BMI) OF 40.0 TO 44.9 IN ADULT (H): ICD-10-CM

## 2024-05-16 DIAGNOSIS — K80.20 CALCULUS OF GALLBLADDER WITHOUT CHOLECYSTITIS WITHOUT OBSTRUCTION: ICD-10-CM

## 2024-05-16 LAB — HCG UR QL: NEGATIVE

## 2024-05-16 PROCEDURE — 99214 OFFICE O/P EST MOD 30 MIN: CPT | Performed by: NURSE PRACTITIONER

## 2024-05-16 PROCEDURE — G2211 COMPLEX E/M VISIT ADD ON: HCPCS | Performed by: NURSE PRACTITIONER

## 2024-05-16 PROCEDURE — 93000 ELECTROCARDIOGRAM COMPLETE: CPT | Performed by: INTERNAL MEDICINE

## 2024-05-16 PROCEDURE — 81025 URINE PREGNANCY TEST: CPT | Performed by: NURSE PRACTITIONER

## 2024-05-16 ASSESSMENT — PAIN SCALES - GENERAL: PAINLEVEL: NO PAIN (0)

## 2024-05-16 NOTE — PROGRESS NOTES
Preoperative Evaluation  [facility]  [address]  [facility] [address]  Phone: [phone]  Primary Provider: Kita Cuellar NP  Pre-op Performing Provider: Kita Cuellar NP  May 16, 2024       Surgical Information  Surgery/Procedure: Laparoscopic Cholecystomy   Surgery Location: [facility]  Surgeon: Dr. Braun  Surgery Date: 06/03/24  Time of Surgery: TBD  Where patient plans to recover: At home with family  Fax number for surgical facility: Note does not need to be faxed, will be available electronically in Epic.    Assessment & Plan     The proposed surgical procedure is considered INTERMEDIATE risk.    1. Pre-operative examination  Exam completed   - EKG 12-lead complete w/read - (Clinic Performed)  - HCG Qual, Urine ([id number])    2. Calculus of gallbladder without cholecystitis without obstruction    3. Hyperlipidemia LDL goal <100    4. Gastroesophageal reflux disease without esophagitis    5. Class 3 severe obesity due to excess calories with serious comorbidity and body mass index (BMI) of 40.0 to 44.9 in adult (H)       - No identified additional risk factors other than previously addressed    Antiplatelet or Anticoagulation Medication Instructions   - Patient is on no antiplatelet or anticoagulation medications.    Additional Medication Instructions  Hold omeprazole   - Statins: Continue taking on the day of surgery.    - Herbal medications and vitamins: DO NOT TAKE 14 days prior to surgery.    Recommendation  Approval given to proceed with proposed procedure, without further diagnostic evaluation.    The longitudinal plan of care for the diagnosis(es)/condition(s) as documented were addressed during this visit. Due to the added complexity in care, I will continue to support Jaycee in the subsequent management and with ongoing continuity of care.    Kita Cuellar,   Certified Adult Nurse Practitioner  [phone]      Reba Champion  is a 38 year old, presenting for the following:  Pre-Op Exam        HPI related to upcoming procedure: right upper quadrant abdominal pain due to gallstones.         5/9/2024   Pre-Op Questionnaire   Have you ever had a heart attack or stroke? No   Have you ever had surgery on your heart or blood vessels, such as a stent placement, a coronary artery bypass, or surgery on an artery in your head, neck, heart, or legs? No   Do you have chest pain with activity? No   Do you have a history of heart failure? No   Do you currently have a cold, bronchitis or symptoms of other infection? No   Do you have a cough, shortness of breath, or wheezing? No   Do you or anyone in your family have previous history of blood clots? No   Do you or does anyone in your family have a serious bleeding problem such as prolonged bleeding following surgeries or cuts? No   Have you ever had problems with anemia or been told to take iron pills? No   Have you had any abnormal blood loss such as black, tarry or bloody stools, or abnormal vaginal bleeding? No   Have you ever had a blood transfusion? No   Are you willing to have a blood transfusion if it is medically needed before, during, or after your surgery? Yes   Have you or any of your relatives ever had problems with anesthesia? No   Do you have sleep apnea, excessive snoring or daytime drowsiness? Yes- snoring    Do you have any artifical heart valves or other implanted medical devices like a pacemaker, defibrillator, or continuous glucose monitor? No   Do you have artificial joints? No   Are you allergic to latex? No   Is there any chance that you may be pregnant? No     Health Care Directive  Patient does not have a Health Care Directive or Living Will: Discussed advance care planning with patient; however, patient declined at this time.    Preoperative Review of    reviewed - no record of controlled substances prescribed.      Status of Chronic Conditions:  HYPERLIPIDEMIA - Patient  has a long history of significant Hyperlipidemia requiring medication for treatment with recent good control. Patient reports no problems or side effects with the medication.     GERD:  controlled with omeprazole.      Patient Active Problem List    Diagnosis Date Noted    Calculus of gallbladder without cholecystitis without obstruction 2024     Priority: Medium    Hyperlipidemia LDL goal <100 2021     Priority: Medium    Hypoglycemia 10/26/2020     Priority: Medium    Morbid obesity (H) 2020     Priority: Medium    Encounter for supervision of normal pregnancy in multigravida 2015     Priority: Medium     Overview:   IMO Update  Overview:   IMO Update      History of  section 2015     Priority: Medium    Uterus, septate 2011     Priority: Medium    Closed fracture of ankle 2011     Priority: Medium     Overview:   IMO Update 10/11      Abdominal pain, recurrent 2010     Priority: Medium     Overview:   IMO Update 10/11        Past Medical History:   Diagnosis Date    Hypertension 2012    Labor Related HBP, lasted 3-4 weeks postpartum     Past Surgical History:   Procedure Laterality Date    GYN SURGERY       Current Outpatient Medications   Medication Sig Dispense Refill    atorvastatin (LIPITOR) 20 MG tablet Take 1 tablet (20 mg) by mouth at bedtime 90 tablet 1    KURVELO 0.15-30 MG-MCG tablet Take 1 tablet by mouth daily      multivitamin (ONE-DAILY) tablet Take 1 tablet by mouth daily      omeprazole (PRILOSEC) 20 MG DR capsule Take 1 capsule (20 mg) by mouth daily 90 capsule 1       No Known Allergies     Social History     Tobacco Use    Smoking status: Never    Smokeless tobacco: Never   Substance Use Topics    Alcohol use: No       History   Drug Use No             Review of Systems  CONSTITUTIONAL: NEGATIVE for fever, chills, change in weight  INTEGUMENTARY/SKIN: NEGATIVE for worrisome rashes, moles or lesions  EYES: NEGATIVE for vision changes  "or irritation  ENT/MOUTH: NEGATIVE for ear, mouth and throat problems  RESP: NEGATIVE for significant cough or SOB  BREAST: NEGATIVE for masses, tenderness or discharge  CV: NEGATIVE for chest pain, palpitations or peripheral edema  GI: abdominal pain - intermittent   : NEGATIVE for frequency, dysuria, or hematuria  MUSCULOSKELETAL: NEGATIVE for significant arthralgias or myalgia  NEURO: NEGATIVE for weakness, dizziness or paresthesias  ENDOCRINE: NEGATIVE for temperature intolerance, skin/hair changes  HEME: NEGATIVE for bleeding problems  PSYCHIATRIC: NEGATIVE for changes in mood or affect    Objective    /84 (BP Location: Left arm, Patient Position: Sitting, Cuff Size: Adult Large)   Pulse 76   Temp 98.8  F (37.1  C) (Tympanic)   Resp 16   Wt 113.6 kg (250 lb 8 oz)   LMP 05/01/2024 (Exact Date)   SpO2 97%   Breastfeeding No   BMI 43.00 kg/m     Estimated body mass index is 43 kg/m  as calculated from the following:    Height as of 5/6/24: 1.626 m (5' 4\").    Weight as of this encounter: 113.6 kg (250 lb 8 oz).    Physical Exam  GENERAL: alert and no distress  EYES: Eyes grossly normal to inspection, PERRL and conjunctivae and sclerae normal  HENT: ear canals and TM's normal, nose and mouth without ulcers or lesions  NECK: no adenopathy, no asymmetry, masses, or scars  RESP: lungs clear to auscultation - no rales, rhonchi or wheezes  CV: regular rate and rhythm, normal S1 S2, no S3 or S4, no murmur, click or rub, no peripheral edema  MS: no gross musculoskeletal defects noted, no edema  PSYCH: mentation appears normal, affect normal/bright    Recent Labs   Lab Test 05/06/24  1200 04/11/24  1216   HGB 13.5 13.1    361    140   POTASSIUM 4.2 4.1   CR 0.62 0.61        Diagnostics  Recent Results (from the past 720 hour(s))   Lipid Profile    Collection Time: 05/06/24 12:00 PM   Result Value Ref Range    Cholesterol 192 <200 mg/dL    Triglycerides 140 <150 mg/dL    Direct Measure HDL 54 " >=50 mg/dL    LDL Cholesterol Calculated 110 (H) <=100 mg/dL    Non HDL Cholesterol 138 (H) <130 mg/dL    Patient Fasting > 8hrs? No    Comprehensive metabolic panel    Collection Time: 05/06/24 12:00 PM   Result Value Ref Range    Sodium 138 135 - 145 mmol/L    Potassium 4.2 3.4 - 5.3 mmol/L    Carbon Dioxide (CO2) 23 22 - 29 mmol/L    Anion Gap 12 7 - 15 mmol/L    Urea Nitrogen 8.4 6.0 - 20.0 mg/dL    Creatinine 0.62 0.51 - 0.95 mg/dL    GFR Estimate >90 >60 mL/min/1.73m2    Calcium 9.2 8.6 - 10.0 mg/dL    Chloride 103 98 - 107 mmol/L    Glucose 86 70 - 99 mg/dL    Alkaline Phosphatase 89 40 - 150 U/L    AST 22 0 - 45 U/L    ALT 30 0 - 50 U/L    Protein Total 7.5 6.4 - 8.3 g/dL    Albumin 4.1 3.5 - 5.2 g/dL    Bilirubin Total 0.3 <=1.2 mg/dL   TSH with free T4 reflex    Collection Time: 05/06/24 12:00 PM   Result Value Ref Range    TSH 0.63 0.30 - 4.20 uIU/mL   CBC with platelets and differential    Collection Time: 05/06/24 12:00 PM   Result Value Ref Range    WBC Count 8.1 4.0 - 11.0 10e3/uL    RBC Count 4.88 3.80 - 5.20 10e6/uL    Hemoglobin 13.5 11.7 - 15.7 g/dL    Hematocrit 41.9 35.0 - 47.0 %    MCV 86 78 - 100 fL    MCH 27.7 26.5 - 33.0 pg    MCHC 32.2 31.5 - 36.5 g/dL    RDW 12.9 10.0 - 15.0 %    Platelet Count 369 150 - 450 10e3/uL    % Neutrophils 60 %    % Lymphocytes 32 %    % Monocytes 7 %    % Eosinophils 0 %    % Basophils 0 %    % Immature Granulocytes 0 %    Absolute Neutrophils 4.9 1.6 - 8.3 10e3/uL    Absolute Lymphocytes 2.6 0.8 - 5.3 10e3/uL    Absolute Monocytes 0.6 0.0 - 1.3 10e3/uL    Absolute Eosinophils 0.0 0.0 - 0.7 10e3/uL    Absolute Basophils 0.0 0.0 - 0.2 10e3/uL    Absolute Immature Granulocytes 0.0 <=0.4 10e3/uL   EKG 12-lead complete w/read - (Clinic Performed)    Collection Time: 05/16/24  1:39 PM   Result Value Ref Range    Systolic Blood Pressure  mmHg    Diastolic Blood Pressure  mmHg    Ventricular Rate 78 BPM    Atrial Rate 78 BPM    VA Interval 156 ms    QRS Duration 78 ms      ms    QTc 437 ms    P Axis 70 degrees    R AXIS 23 degrees    T Axis 35 degrees    Interpretation ECG       Sinus rhythm  Normal ECG  No previous ECGs available     HCG Qual, Urine ([id number])    Collection Time: 05/16/24  1:44 PM   Result Value Ref Range    hCG Urine Qualitative Negative Negative             EKG Interpretation:      Interpreted by Kita Cuellar NP    Symptoms at time of EKG: None   Rhythm: Normal sinus   Rate: Normal  Axis: Normal  Ectopy: None  Conduction: Normal  ST Segments/ T Waves: No ST-T wave changes and No acute ischemic changes  Q Waves: None  Comparison to prior: No old EKG available    Clinical Impression: normal EKG      Revised Cardiac Risk Index (RCRI)  The patient has the following serious cardiovascular risks for perioperative complications:   - No serious cardiac risks = 0 points     RCRI Interpretation: 0 points: Class I (very low risk - 0.4% complication rate)         Signed Electronically by: Kita Cuellar NP  Copy of this evaluation report is provided to requesting physician.

## 2024-05-22 LAB
ATRIAL RATE - MUSE: 78 BPM
DIASTOLIC BLOOD PRESSURE - MUSE: NORMAL MMHG
INTERPRETATION ECG - MUSE: NORMAL
P AXIS - MUSE: 70 DEGREES
PR INTERVAL - MUSE: 156 MS
QRS DURATION - MUSE: 78 MS
QT - MUSE: 384 MS
QTC - MUSE: 437 MS
R AXIS - MUSE: 23 DEGREES
SYSTOLIC BLOOD PRESSURE - MUSE: NORMAL MMHG
T AXIS - MUSE: 35 DEGREES
VENTRICULAR RATE- MUSE: 78 BPM

## 2024-05-24 ENCOUNTER — ANESTHESIA EVENT (OUTPATIENT)
Dept: SURGERY | Facility: HOSPITAL | Age: 39
End: 2024-05-24
Payer: COMMERCIAL

## 2024-05-24 ASSESSMENT — LIFESTYLE VARIABLES: TOBACCO_USE: 0

## 2024-05-24 NOTE — ANESTHESIA PREPROCEDURE EVALUATION
Anesthesia Pre-Procedure Evaluation    Patient: Jaycee Campos   MRN: [id number] : 1985        Procedure : Procedure(s):  laparoscopic cholecystectomy          Past Medical History:   Diagnosis Date     Hypertension 2012    Labor Related HBP, lasted 3-4 weeks postpartum      Past Surgical History:   Procedure Laterality Date     GYN SURGERY        No Known Allergies   Social History     Tobacco Use     Smoking status: Never     Smokeless tobacco: Never   Substance Use Topics     Alcohol use: No      Wt Readings from Last 1 Encounters:   24 113.6 kg (250 lb 8 oz)        Anesthesia Evaluation   Pt has had prior anesthetic. Type: Regional.        ROS/MED HX  ENT/Pulmonary:     (+)     VINNY risk factors, snores loudly, hypertension, obese,                             (-) tobacco use   Neurologic:    (-) no CVA   Cardiovascular:     (+) Dyslipidemia hypertension-range: 139/84 (24)/ -   -  - -                                 Previous cardiac testing   Echo: Date: 11/3/23 Results:  No pericardial effusion is present.  Global and regional left ventricular function is normal with an EF of 60-65%.  Global right ventricular function is normal.  Trace mitral insufficiency is present.  Aortic valve is normal in structure and function.  Trace tricuspid insufficiency is present    Stress Test:  Date: Results:    ECG Reviewed:  Date: 24 Results:  HR 78, sinus  Cath:  Date: Results:   (-) taking anticoagulants/antiplatelets and MANUEL   METS/Exercise Tolerance: >4 METS    Hematologic:    (-) anemia   Musculoskeletal:       GI/Hepatic:     (+) GERD (omeprazole), Asymptomatic on medication,        cholecystitis/cholelithiasis,          Renal/Genitourinary:       Endo:     (+)               Obesity,       Psychiatric/Substance Use:       Infectious Disease:  - neg infectious disease ROS     Malignancy:  - neg malignancy ROS     Other:  - neg other ROS          Physical Exam    Airway        Mallampati: II   TM  "distance: > 3 FB   Neck ROM: full   Mouth opening: > 3 cm    Respiratory Devices and Support         Dental       (+) Minor Abnormalities - some fillings, tiny chips      Cardiovascular          Rhythm and rate: regular and normal     Pulmonary           breath sounds clear to auscultation       OUTSIDE LABS:  CBC:   Lab Results   Component Value Date    WBC 8.1 05/06/2024    WBC 8.2 04/11/2024    HGB 13.5 05/06/2024    HGB 13.1 04/11/2024    HCT 41.9 05/06/2024    HCT 40.3 04/11/2024     05/06/2024     04/11/2024     BMP:   Lab Results   Component Value Date     05/06/2024     04/11/2024    POTASSIUM 4.2 05/06/2024    POTASSIUM 4.1 04/11/2024    CHLORIDE 103 05/06/2024    CHLORIDE 103 04/11/2024    CO2 23 05/06/2024    CO2 22 04/11/2024    BUN 8.4 05/06/2024    BUN 10.1 04/11/2024    CR 0.62 05/06/2024    CR 0.61 04/11/2024    GLC 86 05/06/2024    GLC 93 04/11/2024     COAGS: No results found for: \"PTT\", \"INR\", \"FIBR\"  POC:   Lab Results   Component Value Date    HCG Negative 05/16/2024     HEPATIC:   Lab Results   Component Value Date    ALBUMIN 4.1 05/06/2024    PROTTOTAL 7.5 05/06/2024    ALT 30 05/06/2024    AST 22 05/06/2024    ALKPHOS 89 05/06/2024    BILITOTAL 0.3 05/06/2024     OTHER:   Lab Results   Component Value Date    A1C 5.7 (H) 11/18/2021    ANNALISE 9.2 05/06/2024    LIPASE 16 04/11/2024    TSH 0.63 05/06/2024       Anesthesia Plan    ASA Status:  3    NPO Status:  NPO Appropriate    Anesthesia Type: General.     - Airway: ETT              Consents    Anesthesia Plan(s) and associated risks, benefits, and realistic alternatives discussed. Questions answered and patient/representative(s) expressed understanding.     - Discussed: Risks, Benefits and Alternatives for BOTH SEDATION and the PROCEDURE were discussed     - Discussed with:  Patient      - Extended Intubation/Ventilatory Support Discussed: No.      - Patient is DNR/DNI Status: No     Use of blood products discussed: No " ".     Postoperative Care    Pain management: IV analgesics.   PONV prophylaxis: Ondansetron (or other 5HT-3), Dexamethasone or Solumedrol     Comments:    Other Comments: Reviewed 5/26/24 preop from Kimberley  \"Elevated blood pressure - will recheck next week at he pre-op\"- 139/84 at preop    Discussed risks and benefits with patient for general anesthesia including sore throat, nausea, vomiting, aspiration, dental damage, loss of airway, CV complications, stroke, MI, death. Pt wishes to proceed.              Brea Bender, JENNIFER CNP    I have reviewed the pertinent notes and labs in the chart from the past 30 days and (re)examined the patient.  Any updates or changes from those notes are reflected in this note.              # Severe Obesity: Estimated body mass index is 43 kg/m  as calculated from the following:    Height as of 5/6/24: 1.626 m (5' 4\").    Weight as of 5/16/24: 113.6 kg (250 lb 8 oz).      "

## 2024-05-31 NOTE — DISCHARGE INSTRUCTIONS
ELECTIVE LAPAROSCOPIC CHOLECYSTECTOMY     ACTIVITY:  You may climb stairs.  You may lift or exercise when comfortable.  You may drive without restrictions when comfortable and not using prescription pain medications.    BATHING:  You may take a shower the day following surgery.  You may have steri-strips (looks like tape) on your incision.  They will fall off by themselves; if not, remove after one week.    DIET:  Starting with liquids, gradually return to the diet you were on before surgery.    CALL YOUR PHYSICIAN IF YOU HAVE:  Chills or fever about 101 F  Pain not relieved by your pain medication or rest    HELPFUL HINTS:  It is not uncommon to experience some loose stools or diarrhea after surgery.  This is your body s way of adapting.  You may experience shoulder pain which is due to the air placed within your abdomen during the procedure.  This is temporary and usually passes within 2 days.  If you have difficulty after surgery and are unable to contact your physician at his/her clinic, call the hospitals Emergency Room for assistance at [phone].    Post-Anesthesia Patient Instructions    IMMEDIATELY FOLLOWING SURGERY:  Do not drive or operate machinery for the first twenty four hours after surgery.  Do not make any important decisions for twenty four hours after surgery or while taking narcotic pain medications or sedatives.  If you develop intractable nausea and vomiting or a severe headache please notify your doctor immediately.    FOLLOW-UP:  Please make an appointment with your surgeon as instructed. You do not need to follow up with anesthesia unless specifically instructed to do so.    WOUND CARE INSTRUCTIONS (if applicable):  Keep a dry clean dressing on the anesthesia/puncture wound site if there is drainage.  Once the wound has quit draining you may leave it open to air.  Generally you should leave the bandage intact for twenty four hours unless there is drainage.  If the epidural site  drains for more than 36-48 hours please call the anesthesia department.    QUESTIONS?:  Please feel free to call your physician or the hospital  if you have any questions, and they will be happy to assist you.

## 2024-06-03 ENCOUNTER — ANESTHESIA (OUTPATIENT)
Dept: SURGERY | Facility: HOSPITAL | Age: 39
End: 2024-06-03
Payer: COMMERCIAL

## 2024-06-03 ENCOUNTER — APPOINTMENT (OUTPATIENT)
Dept: ULTRASOUND IMAGING | Facility: HOSPITAL | Age: 39
End: 2024-06-03
Attending: NURSE ANESTHETIST, CERTIFIED REGISTERED
Payer: COMMERCIAL

## 2024-06-03 ENCOUNTER — LAB (OUTPATIENT)
Dept: LAB | Facility: HOSPITAL | Age: 39
End: 2024-06-03
Attending: SURGERY
Payer: COMMERCIAL

## 2024-06-03 ENCOUNTER — HOSPITAL ENCOUNTER (OUTPATIENT)
Facility: HOSPITAL | Age: 39
Discharge: HOME | End: 2024-06-03
Attending: SURGERY | Admitting: SURGERY
Payer: COMMERCIAL

## 2024-06-03 VITALS
SYSTOLIC BLOOD PRESSURE: 152 MMHG | BODY MASS INDEX: 42.51 KG/M2 | HEART RATE: 68 BPM | DIASTOLIC BLOOD PRESSURE: 97 MMHG | OXYGEN SATURATION: 95 % | HEIGHT: 64 IN | WEIGHT: 249 LBS | RESPIRATION RATE: 14 BRPM | TEMPERATURE: 97.9 F

## 2024-06-03 DIAGNOSIS — K80.20 CALCULUS OF GALLBLADDER WITHOUT CHOLECYSTITIS WITHOUT OBSTRUCTION: Primary | ICD-10-CM

## 2024-06-03 LAB — HCG UR QL: NEGATIVE

## 2024-06-03 PROCEDURE — 250N000011 HC RX IP 250 OP 636: Performed by: SURGERY

## 2024-06-03 PROCEDURE — 47562 LAPAROSCOPIC CHOLECYSTECTOMY: CPT | Performed by: NURSE ANESTHETIST, CERTIFIED REGISTERED

## 2024-06-03 PROCEDURE — 250N000013 HC RX MED GY IP 250 OP 250 PS 637: Performed by: SURGERY

## 2024-06-03 PROCEDURE — 88304 TISSUE EXAM BY PATHOLOGIST: CPT | Mod: 26 | Performed by: PATHOLOGY

## 2024-06-03 PROCEDURE — 250N000009 HC RX 250: Performed by: NURSE ANESTHETIST, CERTIFIED REGISTERED

## 2024-06-03 PROCEDURE — 64421 NJX AA&/STRD NTRCOST NRV EA: CPT | Mod: RT | Performed by: NURSE ANESTHETIST, CERTIFIED REGISTERED

## 2024-06-03 PROCEDURE — 258N000003 HC RX IP 258 OP 636: Performed by: NURSE ANESTHETIST, CERTIFIED REGISTERED

## 2024-06-03 PROCEDURE — 81025 URINE PREGNANCY TEST: CPT | Performed by: NURSE ANESTHETIST, CERTIFIED REGISTERED

## 2024-06-03 PROCEDURE — 88304 TISSUE EXAM BY PATHOLOGIST: CPT | Mod: TC | Performed by: SURGERY

## 2024-06-03 PROCEDURE — 250N000011 HC RX IP 250 OP 636: Performed by: NURSE ANESTHETIST, CERTIFIED REGISTERED

## 2024-06-03 PROCEDURE — 272N000001 HC OR GENERAL SUPPLY STERILE: Performed by: SURGERY

## 2024-06-03 PROCEDURE — 370N000017 HC ANESTHESIA TECHNICAL FEE, PER MIN: Performed by: SURGERY

## 2024-06-03 PROCEDURE — C9290 INJ, BUPIVACAINE LIPOSOME: HCPCS | Performed by: NURSE ANESTHETIST, CERTIFIED REGISTERED

## 2024-06-03 PROCEDURE — 710N000012 HC RECOVERY PHASE 2, PER MINUTE: Performed by: SURGERY

## 2024-06-03 PROCEDURE — 999N000141 HC STATISTIC PRE-PROCEDURE NURSING ASSESSMENT: Performed by: SURGERY

## 2024-06-03 PROCEDURE — 360N000076 HC SURGERY LEVEL 3, PER MIN: Performed by: SURGERY

## 2024-06-03 PROCEDURE — 47562 LAPAROSCOPIC CHOLECYSTECTOMY: CPT | Performed by: SURGERY

## 2024-06-03 PROCEDURE — 710N000010 HC RECOVERY PHASE 1, LEVEL 2, PER MIN: Performed by: SURGERY

## 2024-06-03 PROCEDURE — 250N000025 HC SEVOFLURANE, PER MIN: Performed by: SURGERY

## 2024-06-03 RX ORDER — HYDROMORPHONE HYDROCHLORIDE 1 MG/ML
0.5 INJECTION, SOLUTION INTRAMUSCULAR; INTRAVENOUS; SUBCUTANEOUS EVERY 5 MIN PRN
Status: DISCONTINUED | OUTPATIENT
Start: 2024-06-03 | End: 2024-06-03 | Stop reason: HOSPADM

## 2024-06-03 RX ORDER — LABETALOL 20 MG/4 ML (5 MG/ML) INTRAVENOUS SYRINGE
10
Status: DISCONTINUED | OUTPATIENT
Start: 2024-06-03 | End: 2024-06-03 | Stop reason: HOSPADM

## 2024-06-03 RX ORDER — KETAMINE HYDROCHLORIDE 10 MG/ML
INJECTION INTRAMUSCULAR; INTRAVENOUS PRN
Status: DISCONTINUED | OUTPATIENT
Start: 2024-06-03 | End: 2024-06-03

## 2024-06-03 RX ORDER — SODIUM CHLORIDE, SODIUM LACTATE, POTASSIUM CHLORIDE, CALCIUM CHLORIDE 600; 310; 30; 20 MG/100ML; MG/100ML; MG/100ML; MG/100ML
INJECTION, SOLUTION INTRAVENOUS CONTINUOUS
Status: DISCONTINUED | OUTPATIENT
Start: 2024-06-03 | End: 2024-06-03 | Stop reason: HOSPADM

## 2024-06-03 RX ORDER — LIDOCAINE 40 MG/G
CREAM TOPICAL
Status: DISCONTINUED | OUTPATIENT
Start: 2024-06-03 | End: 2024-06-03 | Stop reason: HOSPADM

## 2024-06-03 RX ORDER — HYDRALAZINE HYDROCHLORIDE 20 MG/ML
2.5-5 INJECTION INTRAMUSCULAR; INTRAVENOUS EVERY 10 MIN PRN
Status: DISCONTINUED | OUTPATIENT
Start: 2024-06-03 | End: 2024-06-03 | Stop reason: HOSPADM

## 2024-06-03 RX ORDER — LIDOCAINE HYDROCHLORIDE 20 MG/ML
INJECTION, SOLUTION INFILTRATION; PERINEURAL PRN
Status: DISCONTINUED | OUTPATIENT
Start: 2024-06-03 | End: 2024-06-03

## 2024-06-03 RX ORDER — PROPOFOL 10 MG/ML
INJECTION, EMULSION INTRAVENOUS PRN
Status: DISCONTINUED | OUTPATIENT
Start: 2024-06-03 | End: 2024-06-03

## 2024-06-03 RX ORDER — FENTANYL CITRATE 50 UG/ML
50 INJECTION, SOLUTION INTRAMUSCULAR; INTRAVENOUS EVERY 5 MIN PRN
Status: DISCONTINUED | OUTPATIENT
Start: 2024-06-03 | End: 2024-06-03 | Stop reason: HOSPADM

## 2024-06-03 RX ORDER — METRONIDAZOLE 500 MG/100ML
500 INJECTION, SOLUTION INTRAVENOUS
Status: COMPLETED | OUTPATIENT
Start: 2024-06-03 | End: 2024-06-03

## 2024-06-03 RX ORDER — SCOLOPAMINE TRANSDERMAL SYSTEM 1 MG/1
1 PATCH, EXTENDED RELEASE TRANSDERMAL ONCE
Status: DISCONTINUED | OUTPATIENT
Start: 2024-06-03 | End: 2024-06-03 | Stop reason: HOSPADM

## 2024-06-03 RX ORDER — DEXMEDETOMIDINE HYDROCHLORIDE 4 UG/ML
INJECTION, SOLUTION INTRAVENOUS PRN
Status: DISCONTINUED | OUTPATIENT
Start: 2024-06-03 | End: 2024-06-03

## 2024-06-03 RX ORDER — CEFAZOLIN SODIUM/WATER 2 G/20 ML
2 SYRINGE (ML) INTRAVENOUS
Status: COMPLETED | OUTPATIENT
Start: 2024-06-03 | End: 2024-06-03

## 2024-06-03 RX ORDER — OXYCODONE HYDROCHLORIDE 5 MG/1
5 TABLET ORAL
Status: COMPLETED | OUTPATIENT
Start: 2024-06-03 | End: 2024-06-03

## 2024-06-03 RX ORDER — ONDANSETRON 2 MG/ML
4 INJECTION INTRAMUSCULAR; INTRAVENOUS EVERY 30 MIN PRN
Status: DISCONTINUED | OUTPATIENT
Start: 2024-06-03 | End: 2024-06-03 | Stop reason: HOSPADM

## 2024-06-03 RX ORDER — BUPIVACAINE HYDROCHLORIDE 5 MG/ML
INJECTION, SOLUTION EPIDURAL; INTRACAUDAL
Status: DISCONTINUED
Start: 2024-06-03 | End: 2024-06-03 | Stop reason: HOSPADM

## 2024-06-03 RX ORDER — DEXAMETHASONE SODIUM PHOSPHATE 4 MG/ML
INJECTION, SOLUTION INTRA-ARTICULAR; INTRALESIONAL; INTRAMUSCULAR; INTRAVENOUS; SOFT TISSUE PRN
Status: DISCONTINUED | OUTPATIENT
Start: 2024-06-03 | End: 2024-06-03

## 2024-06-03 RX ORDER — ONDANSETRON 2 MG/ML
INJECTION INTRAMUSCULAR; INTRAVENOUS PRN
Status: DISCONTINUED | OUTPATIENT
Start: 2024-06-03 | End: 2024-06-03

## 2024-06-03 RX ORDER — LABETALOL 20 MG/4 ML (5 MG/ML) INTRAVENOUS SYRINGE
PRN
Status: DISCONTINUED | OUTPATIENT
Start: 2024-06-03 | End: 2024-06-03

## 2024-06-03 RX ORDER — HYDROCODONE BITARTRATE AND ACETAMINOPHEN 5; 325 MG/1; MG/1
1 TABLET ORAL EVERY 6 HOURS PRN
Qty: 18 TABLET | Refills: 0 | Status: SHIPPED | OUTPATIENT
Start: 2024-06-03 | End: 2024-06-06

## 2024-06-03 RX ORDER — ACETAMINOPHEN 325 MG/1
975 TABLET ORAL ONCE
Status: COMPLETED | OUTPATIENT
Start: 2024-06-03 | End: 2024-06-03

## 2024-06-03 RX ORDER — NALOXONE HYDROCHLORIDE 0.4 MG/ML
0.1 INJECTION, SOLUTION INTRAMUSCULAR; INTRAVENOUS; SUBCUTANEOUS
Status: DISCONTINUED | OUTPATIENT
Start: 2024-06-03 | End: 2024-06-03 | Stop reason: HOSPADM

## 2024-06-03 RX ORDER — BUPIVACAINE HYDROCHLORIDE 5 MG/ML
INJECTION, SOLUTION PERINEURAL PRN
Status: DISCONTINUED | OUTPATIENT
Start: 2024-06-03 | End: 2024-06-03 | Stop reason: HOSPADM

## 2024-06-03 RX ORDER — HYDROXYZINE HYDROCHLORIDE 50 MG/ML
50 INJECTION, SOLUTION INTRAMUSCULAR ONCE
Status: COMPLETED | OUTPATIENT
Start: 2024-06-03 | End: 2024-06-03

## 2024-06-03 RX ORDER — ONDANSETRON 4 MG/1
4 TABLET, ORALLY DISINTEGRATING ORAL EVERY 30 MIN PRN
Status: DISCONTINUED | OUTPATIENT
Start: 2024-06-03 | End: 2024-06-03 | Stop reason: HOSPADM

## 2024-06-03 RX ORDER — FENTANYL CITRATE 50 UG/ML
INJECTION, SOLUTION INTRAMUSCULAR; INTRAVENOUS PRN
Status: DISCONTINUED | OUTPATIENT
Start: 2024-06-03 | End: 2024-06-03

## 2024-06-03 RX ORDER — METOPROLOL TARTRATE 1 MG/ML
INJECTION, SOLUTION INTRAVENOUS PRN
Status: DISCONTINUED | OUTPATIENT
Start: 2024-06-03 | End: 2024-06-03

## 2024-06-03 RX ORDER — BUPIVACAINE HYDROCHLORIDE 2.5 MG/ML
INJECTION, SOLUTION EPIDURAL; INFILTRATION; INTRACAUDAL
Status: DISCONTINUED | OUTPATIENT
Start: 2024-06-03 | End: 2024-06-03

## 2024-06-03 RX ORDER — DEXAMETHASONE SODIUM PHOSPHATE 4 MG/ML
4 INJECTION, SOLUTION INTRA-ARTICULAR; INTRALESIONAL; INTRAMUSCULAR; INTRAVENOUS; SOFT TISSUE
Status: DISCONTINUED | OUTPATIENT
Start: 2024-06-03 | End: 2024-06-03 | Stop reason: HOSPADM

## 2024-06-03 RX ORDER — BUPIVACAINE HYDROCHLORIDE 2.5 MG/ML
INJECTION, SOLUTION EPIDURAL; INFILTRATION; INTRACAUDAL
Status: COMPLETED
Start: 2024-06-03 | End: 2024-06-03

## 2024-06-03 RX ORDER — HYDROXYZINE HYDROCHLORIDE 50 MG/ML
INJECTION, SOLUTION INTRAMUSCULAR
Status: COMPLETED
Start: 2024-06-03 | End: 2024-06-03

## 2024-06-03 RX ADMIN — ROCURONIUM BROMIDE 35 MG: 10 INJECTION INTRAVENOUS at 08:26

## 2024-06-03 RX ADMIN — LABETALOL 20 MG/4 ML (5 MG/ML) INTRAVENOUS SYRINGE 10 MG: at 08:52

## 2024-06-03 RX ADMIN — OXYCODONE HYDROCHLORIDE 5 MG: 5 TABLET ORAL at 11:47

## 2024-06-03 RX ADMIN — SCOPALAMINE 1 PATCH: 1 PATCH, EXTENDED RELEASE TRANSDERMAL at 08:05

## 2024-06-03 RX ADMIN — FENTANYL CITRATE 50 MCG: 50 INJECTION INTRAMUSCULAR; INTRAVENOUS at 09:15

## 2024-06-03 RX ADMIN — LABETALOL 20 MG/4 ML (5 MG/ML) INTRAVENOUS SYRINGE 5 MG: at 09:09

## 2024-06-03 RX ADMIN — ROCURONIUM BROMIDE 5 MG: 10 INJECTION INTRAVENOUS at 08:19

## 2024-06-03 RX ADMIN — LABETALOL 20 MG/4 ML (5 MG/ML) INTRAVENOUS SYRINGE 5 MG: at 08:57

## 2024-06-03 RX ADMIN — ONDANSETRON 4 MG: 2 INJECTION INTRAMUSCULAR; INTRAVENOUS at 08:35

## 2024-06-03 RX ADMIN — BUPIVACAINE 10 ML: 13.3 INJECTION, SUSPENSION, LIPOSOMAL INFILTRATION at 10:55

## 2024-06-03 RX ADMIN — DEXAMETHASONE SODIUM PHOSPHATE 8 MG: 4 INJECTION, SOLUTION INTRA-ARTICULAR; INTRALESIONAL; INTRAMUSCULAR; INTRAVENOUS; SOFT TISSUE at 08:35

## 2024-06-03 RX ADMIN — PROPOFOL 250 MG: 10 INJECTION, EMULSION INTRAVENOUS at 08:19

## 2024-06-03 RX ADMIN — SUGAMMADEX 200 MG: 100 INJECTION, SOLUTION INTRAVENOUS at 09:17

## 2024-06-03 RX ADMIN — HYDROMORPHONE HYDROCHLORIDE 0.5 MG: 1 INJECTION, SOLUTION INTRAMUSCULAR; INTRAVENOUS; SUBCUTANEOUS at 10:33

## 2024-06-03 RX ADMIN — HYDROXYZINE HYDROCHLORIDE 50 MG: 50 INJECTION, SOLUTION INTRAMUSCULAR at 10:05

## 2024-06-03 RX ADMIN — METOPROLOL TARTRATE 1 MG: 5 INJECTION INTRAVENOUS at 08:48

## 2024-06-03 RX ADMIN — SODIUM CHLORIDE, POTASSIUM CHLORIDE, SODIUM LACTATE AND CALCIUM CHLORIDE: 600; 310; 30; 20 INJECTION, SOLUTION INTRAVENOUS at 09:15

## 2024-06-03 RX ADMIN — METOPROLOL TARTRATE 1 MG: 5 INJECTION INTRAVENOUS at 08:45

## 2024-06-03 RX ADMIN — DEXMEDETOMIDINE HYDROCHLORIDE 10 MCG: 4 INJECTION, SOLUTION INTRAVENOUS at 08:38

## 2024-06-03 RX ADMIN — MIDAZOLAM 2 MG: 1 INJECTION INTRAMUSCULAR; INTRAVENOUS at 08:12

## 2024-06-03 RX ADMIN — Medication 2 G: at 08:08

## 2024-06-03 RX ADMIN — DEXMEDETOMIDINE HYDROCHLORIDE 10 MCG: 4 INJECTION, SOLUTION INTRAVENOUS at 08:27

## 2024-06-03 RX ADMIN — FENTANYL CITRATE 50 MCG: 50 INJECTION INTRAMUSCULAR; INTRAVENOUS at 09:46

## 2024-06-03 RX ADMIN — SODIUM CHLORIDE, POTASSIUM CHLORIDE, SODIUM LACTATE AND CALCIUM CHLORIDE: 600; 310; 30; 20 INJECTION, SOLUTION INTRAVENOUS at 08:00

## 2024-06-03 RX ADMIN — HYDRALAZINE HYDROCHLORIDE 5 MG: 20 INJECTION INTRAMUSCULAR; INTRAVENOUS at 09:41

## 2024-06-03 RX ADMIN — ACETAMINOPHEN 975 MG: 325 TABLET, FILM COATED ORAL at 08:03

## 2024-06-03 RX ADMIN — FENTANYL CITRATE 50 MCG: 50 INJECTION INTRAMUSCULAR; INTRAVENOUS at 09:18

## 2024-06-03 RX ADMIN — FENTANYL CITRATE 50 MCG: 50 INJECTION INTRAMUSCULAR; INTRAVENOUS at 10:00

## 2024-06-03 RX ADMIN — FENTANYL CITRATE 100 MCG: 50 INJECTION INTRAMUSCULAR; INTRAVENOUS at 08:15

## 2024-06-03 RX ADMIN — LIDOCAINE HYDROCHLORIDE 80 MG: 20 INJECTION, SOLUTION INFILTRATION; PERINEURAL at 08:19

## 2024-06-03 RX ADMIN — FENTANYL CITRATE 50 MCG: 50 INJECTION INTRAMUSCULAR; INTRAVENOUS at 08:34

## 2024-06-03 RX ADMIN — BUPIVACAINE HYDROCHLORIDE 15 ML: 2.5 INJECTION, SOLUTION EPIDURAL; INFILTRATION; INTRACAUDAL at 10:55

## 2024-06-03 RX ADMIN — BUPIVACAINE HYDROCHLORIDE 25 ML: 2.5 INJECTION, SOLUTION EPIDURAL; INFILTRATION; INTRACAUDAL at 11:00

## 2024-06-03 RX ADMIN — Medication 30 MG: at 08:31

## 2024-06-03 RX ADMIN — HYDROMORPHONE HYDROCHLORIDE 0.5 MG: 1 INJECTION, SOLUTION INTRAMUSCULAR; INTRAVENOUS; SUBCUTANEOUS at 10:19

## 2024-06-03 RX ADMIN — Medication 100 MG: at 08:19

## 2024-06-03 RX ADMIN — METOPROLOL TARTRATE 1 MG: 5 INJECTION INTRAVENOUS at 08:46

## 2024-06-03 RX ADMIN — METRONIDAZOLE 500 MG: 500 INJECTION, SOLUTION INTRAVENOUS at 08:01

## 2024-06-03 RX ADMIN — FENTANYL CITRATE 50 MCG: 50 INJECTION INTRAMUSCULAR; INTRAVENOUS at 08:37

## 2024-06-03 ASSESSMENT — ACTIVITIES OF DAILY LIVING (ADL)
ADLS_ACUITY_SCORE: 29

## 2024-06-03 NOTE — ANESTHESIA POSTPROCEDURE EVALUATION
Patient: Jaycee Campos    Procedure: Procedure(s):  laparoscopic cholecystectomy       Anesthesia Type:  General    Note:  Disposition: Outpatient   Postop Pain Control: Challenging            Challenges/Interventions: Acute Pain (Rescue Rectus sheath and External oblique intercostal block done)            Sign Out: Well controlled pain   PONV: No   Neuro/Psych: Uneventful            Sign Out: Acceptable/Baseline neuro status   Airway/Respiratory: Uneventful            Sign Out: Acceptable/Baseline resp. status   CV/Hemodynamics: Uneventful            Sign Out: Acceptable CV status; No obvious hypovolemia; No obvious fluid overload   Other NRE: NONE   DID A NON-ROUTINE EVENT OCCUR? No       Last vitals:  Vitals Value Taken Time   /88 06/03/24 1120   Temp 97.9  F (36.6  C) 06/03/24 1120   Pulse 70 06/03/24 1120   Resp 17 06/03/24 1120   SpO2 95 % 06/03/24 1120   Vitals shown include unfiled device data.    Electronically Signed By: JENNIFER Jones CRNA  Bianca 3, 2024  12:49 PM

## 2024-06-03 NOTE — OP NOTE
REPORT OF OPERATION  DATE OF PROCEDURE: 6/3/2024    PATIENT: Jaycee Campos    SURGERY PERFORMED: Laparoscopic Cholecystectomy    PREOPERATIVE DIAGNOSIS: Symptomoatic Cholelithiasis    POSTOPERATIVE DIAGNOSIS: Symptomoatic Cholelithiasis    SURGEON: Octavio Braun MD    ASSISTANTS: None    ANESTHESIA: Monitored Anesthesia Care    COMPLICATIONS: None apparent    TRANSFUSIONS: None    TISSUE TO PATHOLOGY: Gallbladder and contents to Pathology for pathological diagnosis    FINDINGS: Symptomoatic Cholelithiasis    INDICATIONS: This is a 38 year old female with Symptomoatic Cholelithiasis.  The patient will be taken to the operative suite for a laparoscopic possible open cholecystectomy.    DESCRIPTIONS OF PROCEDURE IN DETAIL: After consent was obtained the patient was taken to the operative suite and glo in the supine position.  The patient was identified and the correct patient was confirmed.  General endotracheal anesthesia was induced by anesthesia.  The patient was sterilely prepped and draped in the usual fashion.  A time out was performed verifying the correct patient and the correct procedure.  The entire operative team was in agreement.  All necessary equipment and supplies were in the room.    Through a supraumbilical incision, the skin was sharply entered and dissection was carried down until isolation of the fascia.  Via Mark technique, two stay sutures of 0 Vicryl were placed and the fascia was incised.  Entrance into the abdomen was accomplished.  A 10mm blunt-ended trocar was then inserted into the abdomen and pneumoperitoneum was obtained.  The laparoscope was inserted through the trocar and verification of no intraabdominal trauma was made.  Under direct visualization two 5mm right lateral trocars and a 5mm subxiphoid trocar were placed with verification of no intraabdominal trauma.  The fundus of the gallbladder was then identified, isolated and grasped with an atraumatic grasper and retracted  cephalad.  Aimee's pouch was then identified, isolated and grasped with an atraumatic grasper and retracted laterally and inferiorly opening the [place] of [place].  Dissection in the [place] of [place] yielded the cystic duct which was divided between clips.  The cystic artery was then identified and divided between clips.  The gallbladder was removed from the gallbladder fossa using Bovie electrocautery.   The gallbladder was removed from the abdomen and sent to Pathology for pathological diagnoses.  Hemostasis was assured.  All instrumentation was removed.  Sponge, needle and instrument counts were correct.  The supraumbilical trocars sites fascia was closed with a figure-of-eight of 0 Vicryl.  All skin incisions were closed with stainless steel staples.  Sterile dressings were applied.  The patient was awakened in the operative suite and extubated without difficulty and taken to the recovery room in stable condition tolerating the procedure well

## 2024-06-03 NOTE — ANESTHESIA PROCEDURE NOTES
Other (external oblique intercostal) Procedure Note    Pre-Procedure   Staff -        CRNA: Yelitza Gee APRN CRNA       Performed By: CRNA       Location: post-op       Procedure Start/Stop Times: 6/3/2024 11:00 AM and 6/3/2024 11:05 AM       Pre-Anesthestic Checklist: patient identified, IV checked, site marked, risks and benefits discussed, informed consent, monitors and equipment checked, pre-op evaluation, at physician/surgeon's request and post-op pain management  Timeout:       Correct Patient: Yes        Correct Procedure: Yes        Correct Site: Yes        Correct Position: Yes        Correct Laterality: Yes        Site Marked: Yes  Procedure Documentation  Procedure: Other (external oblique intercostal)       Laterality: right       Patient Position: supine       Skin prep: Chloraprep       Needle Type: insulated and short bevel       Needle Gauge: 20.        Needle Length (Inches): 4        Ultrasound guided       1. Ultrasound was used to identify targeted nerve, plexus, vascular marker, or fascial plane and place a needle adjacent to it in real-time.       2. Ultrasound was used to visualize the spread of anesthetic in close proximity to the above referenced structure.       3. A permanent image is entered into the patient's record.       4. The visualized anatomic structures appeared normal.       5. There were no apparent abnormal pathologic findings.    Assessment/Narrative         The placement was negative for: blood aspirated, painful injection and site bleeding       Paresthesias: No.       Bolus given via needle..        Secured via.        Insertion/Infusion Method: Single Shot       Complications: none       Injection made incrementally with aspirations every 5 mL.    Medication(s) Administered   Bupivacaine 0.25% PF (Infiltration) - Infiltration   25 mL - 6/3/2024 11:00:00 AM  Bupivacaine liposome (Exparel) 1.3% LA inj susp (Infiltration) - Infiltration   10 mL - 6/3/2024 11:00:00  "AM  Medication Administration Time: 6/3/2024 11:00 AM      FOR [facility] ([facility]) ONLY:   Pain Team Contact information: please page the Pain Team Via [employer]. Search \"Pain\". During daytime hours, please page the attending first. At night please page the resident first.      "

## 2024-06-03 NOTE — OR NURSING
COLE Torre, updated at bedside of pain level at 8-9/10. Plan for CRNA to perform nerve block in PACU.

## 2024-06-03 NOTE — LETTER
06/03/24      To Whom it may concern:    ____Matt Rich_____ was in our facility today, 06/03/24. with a patient who needed their assistance. Please excuse them from work/school from 6/3/24- 6/5/23.       Sincerely,    Dr. Octavio Almanza RN

## 2024-06-03 NOTE — ANESTHESIA PROCEDURE NOTES
Rectus Sheath Procedure Note    Pre-Procedure   Staff -        CRNA: Yelitza Gee APRN CRNA       Performed By: CRNA       Location: post-op       Procedure Start/Stop Times: 6/3/2024 10:55 AM and 6/3/2024 11:00 AM       Pre-Anesthestic Checklist: patient identified, IV checked, site marked, risks and benefits discussed, informed consent, monitors and equipment checked, pre-op evaluation, at physician/surgeon's request and post-op pain management  Timeout:       Correct Patient: Yes        Correct Procedure: Yes        Correct Site: Yes        Correct Position: Yes        Correct Laterality: Yes        Site Marked: Yes  Procedure Documentation  Procedure: Rectus Sheath       Laterality: right       Patient Position: supine       Skin prep: Chloraprep       Needle Type: insulated and short bevel       Needle Gauge: 20.        Needle Length (Inches): 4        Ultrasound guided       1. Ultrasound was used to identify targeted nerve, plexus, vascular marker, or fascial plane and place a needle adjacent to it in real-time.       2. Ultrasound was used to visualize the spread of anesthetic in close proximity to the above referenced structure.       3. A permanent image is entered into the patient's record.       4. The visualized anatomic structures appeared normal.       5. There were no apparent abnormal pathologic findings.    Assessment/Narrative         The placement was negative for: blood aspirated, painful injection and site bleeding       Paresthesias: No.       Bolus given via needle..        Secured via.        Insertion/Infusion Method: Single Shot       Complications: none       Injection made incrementally with aspirations every 5 mL.    Medication(s) Administered   Bupivacaine 0.25% PF (Infiltration) - Infiltration   15 mL - 6/3/2024 10:55:00 AM  Bupivacaine liposome (Exparel) 1.3% LA inj susp (Infiltration) - Infiltration   10 mL - 6/3/2024 10:55:00 AM  Medication Administration Time: 6/3/2024 10:55  "AM      FOR [facility] ([facility]) ONLY:   Pain Team Contact information: please page the Pain Team Via [employer]. Search \"Pain\". During daytime hours, please page the attending first. At night please page the resident first.      "

## 2024-06-03 NOTE — ANESTHESIA PROCEDURE NOTES
Airway         Procedure Start/Stop Times: 6/3/2024 8:20 AM  Staff -        CRNA: Yelitza Gee APRN CRNA       Performed By: CRNAIndications and Patient Condition       Indications for airway management: ahsan-procedural and airway protection       Induction type:intravenous       Mask difficulty assessment: 1 - vent by mask    Final Airway Details       Final airway type: endotracheal airway       Successful airway: ETT - single  Endotracheal Airway Details        ETT size (mm): 7.0       Successful intubation technique: direct laryngoscopy       DL Blade Type: [place] 2       Grade View of Cords: 1       Position: Right       Measured from: lips       Secured at (cm): 22       Bite block used: None    Post intubation assessment        Placement verified by: capnometry, equal breath sounds and chest rise        Number of attempts at approach: 1       Secured with: plastic tape       Ease of procedure: easy       Dentition: Intact and Unchanged    Medication(s) Administered   Medication Administration Time: 6/3/2024 8:20 AM

## 2024-06-03 NOTE — ANESTHESIA CARE TRANSFER NOTE
Patient: Jaycee Campos    Procedure: Procedure(s):  laparoscopic cholecystectomy       Diagnosis: Cholelithiasis [K80.20]  Diagnosis Additional Information: No value filed.    Anesthesia Type:   General     Note:    Oropharynx: oropharynx clear of all foreign objects and spontaneously breathing  Level of Consciousness: drowsy  Oxygen Supplementation: nasal cannula  Level of Supplemental Oxygen (L/min / FiO2): 2  Independent Airway: airway patency satisfactory and stable  Dentition: dentition unchanged  Vital Signs Stable: post-procedure vital signs reviewed and stable  Report to RN Given: handoff report given  Patient transferred to: PACU    Handoff Report: Identifed the Patient, Identified the Reponsible Provider, Reviewed the pertinent medical history, Discussed the surgical course, Reviewed Intra-OP anesthesia mangement and issues during anesthesia, Set expectations for post-procedure period and Allowed opportunity for questions and acknowledgement of understanding      Vitals:  Vitals Value Taken Time   /94 06/03/24 0935   Temp     Pulse 73 06/03/24 0936   Resp 22 06/03/24 0936   SpO2 94 % 06/03/24 0936   Vitals shown include unfiled device data.    Electronically Signed By: JENNIFER Hamilton CRNA  Bianca 3, 2024  9:38 AM

## 2024-06-04 LAB
PATH REPORT.COMMENTS IMP SPEC: NORMAL
PATH REPORT.FINAL DX SPEC: NORMAL
PATH REPORT.GROSS SPEC: NORMAL
PATH REPORT.MICROSCOPIC SPEC OTHER STN: NORMAL
PATH REPORT.RELEVANT HX SPEC: NORMAL
PHOTO IMAGE: NORMAL

## 2024-06-19 ENCOUNTER — OFFICE VISIT (OUTPATIENT)
Dept: SURGERY | Facility: OTHER | Age: 39
End: 2024-06-19
Attending: NURSE PRACTITIONER
Payer: COMMERCIAL

## 2024-06-19 VITALS
TEMPERATURE: 98.7 F | BODY MASS INDEX: 42.85 KG/M2 | OXYGEN SATURATION: 98 % | HEART RATE: 77 BPM | RESPIRATION RATE: 16 BRPM | SYSTOLIC BLOOD PRESSURE: 118 MMHG | WEIGHT: 251 LBS | DIASTOLIC BLOOD PRESSURE: 74 MMHG | HEIGHT: 64 IN

## 2024-06-19 DIAGNOSIS — Z90.49 STATUS POST LAPAROSCOPIC CHOLECYSTECTOMY: Primary | ICD-10-CM

## 2024-06-19 PROCEDURE — 99024 POSTOP FOLLOW-UP VISIT: CPT | Performed by: NURSE PRACTITIONER

## 2024-06-19 ASSESSMENT — PAIN SCALES - GENERAL: PAINLEVEL: NO PAIN (0)

## 2024-06-19 NOTE — PROGRESS NOTES
"CLINIC NOTE - POST-OP SURGERY  6/19/2024    Patient:Jaycee Campos    Procedure: Laparoscopic Cholecystectomy    This is a 38 year old female who is 2 weeks s/p Laparoscopic Cholecystectomy.  The patient has no complaints today.     Current Medications:  Current Outpatient Medications   Medication Sig Dispense Refill    atorvastatin (LIPITOR) 20 MG tablet Take 1 tablet (20 mg) by mouth at bedtime 90 tablet 1    KURVELO 0.15-30 MG-MCG tablet Take 1 tablet by mouth daily      omeprazole (PRILOSEC) 20 MG DR capsule Take 1 capsule (20 mg) by mouth daily 90 capsule 1       Allergies:  No Known Allergies    PHYSICAL EXAM:   Vital signs: /74 (BP Location: Right arm, Cuff Size: Adult Regular)   Pulse 77   Temp 98.7  F (37.1  C)   Resp 16   Ht 1.626 m (5' 4\")   Wt 113.9 kg (251 lb)   LMP 05/29/2024 (Exact Date)   SpO2 98%   BMI 43.08 kg/m     BMI: Body mass index is 43.08 kg/m .   General: Normal, healthy, cooperative, in no acute distress, alert   Lungs: respirations are non-labored   Abdominal: non-distended   Wounds:  Well healed surgical scars consistent with her operation.     PATHOLOGY:  Case Report   Date Value Ref Range Status   06/03/2024   Final    Surgical Pathology Report                         Case: [id number]                                  Authorizing Provider:  Octavio Braun MD  Collected:           06/03/2024 08:53 AM          Ordering Location:     [facility] Main Operating Room     Received:            06/03/2024 01:14 PM          Pathologist:           Mika Gallegos DO                                                         Specimen:    Gallbladder, gallbladder and contents                                                       Final Diagnosis   Date Value Ref Range Status   06/03/2024   Final    A.  Gallbladder, cholecystectomy:  -Mild chronic cholecystitis.  -Sparse cholesterolosis.  -Cholelithiasis with stone impaction in gallbladder neck.          ASSESSMENT:    38 year old female " who is 2 weeks s/p Laparoscopic Cholecystectomy.  Doing well.     PLAN:   Staples were removed without problems.     Follow-up as needed      Restrictions : Will continue lifting restrictions of no more than 10 pounds until 6 weeks after surgery

## 2024-09-14 ENCOUNTER — HEALTH MAINTENANCE LETTER (OUTPATIENT)
Age: 39
End: 2024-09-14

## 2024-09-27 ENCOUNTER — TRANSFERRED RECORDS (OUTPATIENT)
Dept: HEALTH INFORMATION MANAGEMENT | Facility: HOSPITAL | Age: 39
End: 2024-09-27

## 2024-09-27 LAB
HPV ABSTRACT: NORMAL
PAP-ABSTRACT: NORMAL

## 2024-10-29 ENCOUNTER — TELEPHONE (OUTPATIENT)
Dept: FAMILY MEDICINE | Facility: OTHER | Age: 39
End: 2024-10-29

## 2024-10-29 NOTE — PROGRESS NOTES
"  Assessment & Plan     1. Abdominal pain, epigastric (Primary)  Normal XR abdomen   - XR ABDOMEN 2 VW (Clinic Performed); Future    2. Abdominal pain, left upper quadrant  As above  - XR ABDOMEN 2 VW (Clinic Performed); Future    3. Abdominal pain, right upper quadrant  Urine culture pending.    - XR ABDOMEN 2 VW (Clinic Performed); Future  - CBC with platelets and differential; Future  - UA Macroscopic with reflex to Microscopic and Culture; Future  - ESR: Erythrocyte sedimentation rate; Future  - Amylase; Future  - Lipase; Future  - Comprehensive metabolic panel; Future    CT abdomen is ordered for further evaluation.         BMI  Estimated body mass index is 43.84 kg/m  as calculated from the following:    Height as of this encounter: 1.626 m (5' 4\").    Weight as of this encounter: 115.8 kg (255 lb 6.4 oz).   Weight management plan: Discussed healthy diet and exercise guidelines    Will notify of results as they are returned.      The longitudinal plan of care for the diagnosis(es)/condition(s) as documented were addressed during this visit. Due to the added complexity in care, I will continue to support Jaycee in the subsequent management and with ongoing continuity of care.    Kita Cuellar,   Certified Adult Nurse Practitioner  [phone]      Reba Champion is a 38 year old, presenting for the following health issues:  RECHECK    HPI     Pain History:  When did you first notice your pain? Yesterday morning   Have you seen anyone else for your pain? No  How has your pain affected your ability to work? Not applicable  Where in your body do you have pain? Abdominal/Flank Pain  Onset/Duration: yesterday morning  Description:   Character: Sharp  Location: epigastric region right upper quadrant left upper quadrant  Radiation: Back and Shoulder  Intensity: mild  Progression of Symptoms:  same  Accompanying Signs & Symptoms:  Fever/Chills: No  Gas/Bloating: No  Nausea: YES  Vomitting: No  Diarrhea: " "YES  Constipation: No  Dysuria or Hematuria: No  History:   Trauma: No  Previous similar pain: YES  Previous tests done: none  Precipitating factors:   Does the pain change with:     Food: No    Bowel Movement: No    Urination: No   Other factors:  No  Therapies tried and outcome: None  Patient's last menstrual period was 10/17/2024 (approximate).          Recent Labs   Lab Test 10/30/24  1036 05/06/24  1200 04/11/24  1216 11/03/23  0957 11/18/21  0853 11/18/21  0853 11/04/20  0000 09/02/20  1603 05/06/19  0940   A1C  --   --   --   --   --  5.7* 5.5  --   --    LDL  --  110*  --  117*  --  220*  --  160* 202*   HDL  --  54  --  55  --  61  --  64 62   TRIG  --  140  --  158*  --  151*  --  295* 111   ALT 33 30 25 24   < >  --   --   --   --    CR 0.57 0.62 0.61 0.65   < > 0.70  --  0.58 0.65   GFRESTIMATED >90 >90 >90 >90   < > >90  --  >90 >90   GFRESTBLACK  --   --   --   --   --   --   --  >90 >90   POTASSIUM 4.0 4.2 4.1 4.1   < > 4.4  --  3.9 4.1   TSH  --  0.63  --  1.14  --  0.66  --  1.07 0.79    < > = values in this interval not displayed.      BP Readings from Last 3 Encounters:   10/30/24 (!) 146/88   06/19/24 118/74   06/03/24 152/97    Wt Readings from Last 3 Encounters:   10/30/24 115.8 kg (255 lb 6.4 oz)   06/19/24 113.9 kg (251 lb)   06/03/24 112.9 kg (249 lb)                 Review of Systems  Constitutional, neuro, ENT, endocrine, pulmonary, cardiac, gastrointestinal, genitourinary, musculoskeletal, integument and psychiatric systems are negative, except as otherwise noted.      Objective    BP (!) 146/88 (BP Location: Right arm, Patient Position: Sitting, Cuff Size: Adult Large)   Pulse 88   Temp 98.3  F (36.8  C) (Tympanic)   Resp 16   Ht 1.626 m (5' 4\")   Wt 115.8 kg (255 lb 6.4 oz)   LMP 10/17/2024 (Approximate)   SpO2 98%   BMI 43.84 kg/m    Body mass index is 43.84 kg/m .  Physical Exam   GENERAL: alert and no distress  NECK: no adenopathy, no asymmetry, masses, or scars, and thyroid " normal to palpation  RESP: lungs clear to auscultation - no rales, rhonchi or wheezes  CV: regular rate and rhythm, normal S1 S2, no S3 or S4, no murmur, click or rub, no peripheral edema  ABDOMEN: bowel sounds normal, epigastric and right and left upper quadrant tenderness with palpation.  No guarding.    MS: no gross musculoskeletal defects noted, no edema  PSYCH: mentation appears normal, affect normal/bright    Results for orders placed or performed in visit on 10/30/24   XR ABDOMEN 2 VW (Clinic Performed)     Status: None    Narrative    PROCEDURE: XR ABDOMEN 2 VIEWS 10/30/2024 10:50 AM    HISTORY: Abdominal pain, epigastric; Abdominal pain, left upper  quadrant; Abdominal pain, right upper quadrant    COMPARISONS: None.    TECHNIQUE: Flat and upright    FINDINGS: Intestinal gas pattern is normal. There is no extraluminal  gas or pathologic intra-abdominal calcifications.         Impression    IMPRESSION: Normal abdominal gas pattern    ABDIRAHMAN ROTH MD         SYSTEM ID:  [id number]   Results for orders placed or performed in visit on 10/30/24   UA Macroscopic with reflex to Microscopic and Culture     Status: Abnormal    Specimen: Urine, Midstream   Result Value Ref Range    Color Urine Yellow Colorless, Straw, Light Yellow, Yellow    Appearance Urine Clear Clear    Glucose Urine Negative Negative mg/dL    Bilirubin Urine Negative Negative    Ketones Urine Negative Negative mg/dL    Specific Gravity Urine >=1.030 1.003 - 1.035    Blood Urine Negative Negative    pH Urine 6.0 5.0 - 7.0    Protein Albumin Urine Negative Negative mg/dL    Urobilinogen Urine 0.2 0.2, 1.0 E.U./dL    Nitrite Urine Negative Negative    Leukocyte Esterase Urine Trace (A) Negative   ESR: Erythrocyte sedimentation rate     Status: Normal   Result Value Ref Range    Erythrocyte Sedimentation Rate 18 0 - 20 mm/hr   Amylase     Status: Normal   Result Value Ref Range    Amylase 40 28 - 100 U/L   Lipase     Status: Normal   Result Value  Ref Range    Lipase 23 13 - 60 U/L   Comprehensive metabolic panel     Status: Abnormal   Result Value Ref Range    Sodium 139 135 - 145 mmol/L    Potassium 4.0 3.4 - 5.3 mmol/L    Carbon Dioxide (CO2) 22 22 - 29 mmol/L    Anion Gap 12 7 - 15 mmol/L    Urea Nitrogen 9.0 6.0 - 20.0 mg/dL    Creatinine 0.57 0.51 - 0.95 mg/dL    GFR Estimate >90 >60 mL/min/1.73m2    Calcium 8.9 8.8 - 10.4 mg/dL    Chloride 105 98 - 107 mmol/L    Glucose 130 (H) 70 - 99 mg/dL    Alkaline Phosphatase 95 40 - 150 U/L    AST 31 0 - 45 U/L    ALT 33 0 - 50 U/L    Protein Total 6.9 6.4 - 8.3 g/dL    Albumin 4.0 3.5 - 5.2 g/dL    Bilirubin Total <0.2 <=1.2 mg/dL   CBC with platelets and differential     Status: None   Result Value Ref Range    WBC Count 9.6 4.0 - 11.0 10e3/uL    RBC Count 4.80 3.80 - 5.20 10e6/uL    Hemoglobin 13.5 11.7 - 15.7 g/dL    Hematocrit 41.3 35.0 - 47.0 %    MCV 86 78 - 100 fL    MCH 28.1 26.5 - 33.0 pg    MCHC 32.7 31.5 - 36.5 g/dL    RDW 13.0 10.0 - 15.0 %    Platelet Count 348 150 - 450 10e3/uL    % Neutrophils 67 %    % Lymphocytes 26 %    % Monocytes 7 %    % Eosinophils 1 %    % Basophils 0 %    % Immature Granulocytes 0 %    Absolute Neutrophils 6.4 1.6 - 8.3 10e3/uL    Absolute Lymphocytes 2.5 0.8 - 5.3 10e3/uL    Absolute Monocytes 0.7 0.0 - 1.3 10e3/uL    Absolute Eosinophils 0.1 0.0 - 0.7 10e3/uL    Absolute Basophils 0.0 0.0 - 0.2 10e3/uL    Absolute Immature Granulocytes 0.0 <=0.4 10e3/uL   UA Microscopic with Reflex to Culture     Status: Abnormal   Result Value Ref Range    Bacteria Urine Few (A) None Seen /HPF    RBC Urine 0-2 0-2 /HPF /HPF    WBC Urine 5-10 (A) 0-5 /HPF /HPF    Squamous Epithelials Urine Few (A) None Seen /LPF    Mucus Urine Present (A) None Seen /LPF    Narrative    Urine Culture not indicated   CBC with platelets and differential     Status: None    Narrative    The following orders were created for panel order CBC with platelets and differential.  Procedure                                Abnormality         Status                     ---------                               -----------         ------                     CBC with platelets and d...[[id number]]                      Final result                 Please view results for these tests on the individual orders.             Signed Electronically by: Kita Cuellar NP

## 2024-10-29 NOTE — TELEPHONE ENCOUNTER
1:19 PM    Reason for Call: OVERBOOK    Patient is having the following symptoms: CONCERNS ABOUT PAIN AFTER GALLBLADDER SURGERY     The patient is requesting an appointment for ASAP with TATYANA COPELAND.    Was an appointment offered for this call? No  If yes : Appointment type              Date    Preferred method for responding to this message: Telephone Call  What is your phone number ? [phone]     If we cannot reach you directly, may we leave a detailed response at the number you provided? Yes    Can this message wait until your PCP/provider returns, if unavailable today? Not applicable, PROVIDER IN CLINIC    Karmen Ríos

## 2024-10-30 ENCOUNTER — ANCILLARY PROCEDURE (OUTPATIENT)
Dept: GENERAL RADIOLOGY | Facility: OTHER | Age: 39
End: 2024-10-30
Attending: NURSE PRACTITIONER
Payer: COMMERCIAL

## 2024-10-30 ENCOUNTER — OFFICE VISIT (OUTPATIENT)
Dept: FAMILY MEDICINE | Facility: OTHER | Age: 39
End: 2024-10-30
Attending: NURSE PRACTITIONER
Payer: COMMERCIAL

## 2024-10-30 VITALS
BODY MASS INDEX: 43.6 KG/M2 | OXYGEN SATURATION: 98 % | HEART RATE: 88 BPM | DIASTOLIC BLOOD PRESSURE: 88 MMHG | SYSTOLIC BLOOD PRESSURE: 146 MMHG | WEIGHT: 255.4 LBS | RESPIRATION RATE: 16 BRPM | HEIGHT: 64 IN | TEMPERATURE: 98.3 F

## 2024-10-30 DIAGNOSIS — R10.12 ABDOMINAL PAIN, LEFT UPPER QUADRANT: ICD-10-CM

## 2024-10-30 DIAGNOSIS — R10.11 ABDOMINAL PAIN, RIGHT UPPER QUADRANT: ICD-10-CM

## 2024-10-30 DIAGNOSIS — R10.13 ABDOMINAL PAIN, EPIGASTRIC: Primary | ICD-10-CM

## 2024-10-30 DIAGNOSIS — R10.13 ABDOMINAL PAIN, EPIGASTRIC: ICD-10-CM

## 2024-10-30 LAB
ALBUMIN SERPL BCG-MCNC: 4 G/DL (ref 3.5–5.2)
ALBUMIN UR-MCNC: NEGATIVE MG/DL
ALP SERPL-CCNC: 95 U/L (ref 40–150)
ALT SERPL W P-5'-P-CCNC: 33 U/L (ref 0–50)
AMYLASE SERPL-CCNC: 40 U/L (ref 28–100)
ANION GAP SERPL CALCULATED.3IONS-SCNC: 12 MMOL/L (ref 7–15)
APPEARANCE UR: CLEAR
AST SERPL W P-5'-P-CCNC: 31 U/L (ref 0–45)
BACTERIA #/AREA URNS HPF: ABNORMAL /HPF
BASOPHILS # BLD AUTO: 0 10E3/UL (ref 0–0.2)
BASOPHILS NFR BLD AUTO: 0 %
BILIRUB SERPL-MCNC: <0.2 MG/DL
BILIRUB UR QL STRIP: NEGATIVE
BUN SERPL-MCNC: 9 MG/DL (ref 6–20)
CALCIUM SERPL-MCNC: 8.9 MG/DL (ref 8.8–10.4)
CHLORIDE SERPL-SCNC: 105 MMOL/L (ref 98–107)
COLOR UR AUTO: YELLOW
CREAT SERPL-MCNC: 0.57 MG/DL (ref 0.51–0.95)
EGFRCR SERPLBLD CKD-EPI 2021: >90 ML/MIN/1.73M2
EOSINOPHIL # BLD AUTO: 0.1 10E3/UL (ref 0–0.7)
EOSINOPHIL NFR BLD AUTO: 1 %
ERYTHROCYTE [DISTWIDTH] IN BLOOD BY AUTOMATED COUNT: 13 % (ref 10–15)
ERYTHROCYTE [SEDIMENTATION RATE] IN BLOOD BY WESTERGREN METHOD: 18 MM/HR (ref 0–20)
GLUCOSE SERPL-MCNC: 130 MG/DL (ref 70–99)
GLUCOSE UR STRIP-MCNC: NEGATIVE MG/DL
HCO3 SERPL-SCNC: 22 MMOL/L (ref 22–29)
HCT VFR BLD AUTO: 41.3 % (ref 35–47)
HGB BLD-MCNC: 13.5 G/DL (ref 11.7–15.7)
HGB UR QL STRIP: NEGATIVE
IMM GRANULOCYTES # BLD: 0 10E3/UL
IMM GRANULOCYTES NFR BLD: 0 %
KETONES UR STRIP-MCNC: NEGATIVE MG/DL
LEUKOCYTE ESTERASE UR QL STRIP: ABNORMAL
LIPASE SERPL-CCNC: 23 U/L (ref 13–60)
LYMPHOCYTES # BLD AUTO: 2.5 10E3/UL (ref 0.8–5.3)
LYMPHOCYTES NFR BLD AUTO: 26 %
MCH RBC QN AUTO: 28.1 PG (ref 26.5–33)
MCHC RBC AUTO-ENTMCNC: 32.7 G/DL (ref 31.5–36.5)
MCV RBC AUTO: 86 FL (ref 78–100)
MONOCYTES # BLD AUTO: 0.7 10E3/UL (ref 0–1.3)
MONOCYTES NFR BLD AUTO: 7 %
MUCOUS THREADS #/AREA URNS LPF: PRESENT /LPF
NEUTROPHILS # BLD AUTO: 6.4 10E3/UL (ref 1.6–8.3)
NEUTROPHILS NFR BLD AUTO: 67 %
NITRATE UR QL: NEGATIVE
PH UR STRIP: 6 [PH] (ref 5–7)
PLATELET # BLD AUTO: 348 10E3/UL (ref 150–450)
POTASSIUM SERPL-SCNC: 4 MMOL/L (ref 3.4–5.3)
PROT SERPL-MCNC: 6.9 G/DL (ref 6.4–8.3)
RBC # BLD AUTO: 4.8 10E6/UL (ref 3.8–5.2)
RBC #/AREA URNS AUTO: ABNORMAL /HPF
SODIUM SERPL-SCNC: 139 MMOL/L (ref 135–145)
SP GR UR STRIP: >=1.03 (ref 1–1.03)
SQUAMOUS #/AREA URNS AUTO: ABNORMAL /LPF
UROBILINOGEN UR STRIP-ACNC: 0.2 E.U./DL
WBC # BLD AUTO: 9.6 10E3/UL (ref 4–11)
WBC #/AREA URNS AUTO: ABNORMAL /HPF

## 2024-10-30 PROCEDURE — G2211 COMPLEX E/M VISIT ADD ON: HCPCS | Performed by: NURSE PRACTITIONER

## 2024-10-30 PROCEDURE — 85025 COMPLETE CBC W/AUTO DIFF WBC: CPT | Performed by: NURSE PRACTITIONER

## 2024-10-30 PROCEDURE — 83690 ASSAY OF LIPASE: CPT | Performed by: NURSE PRACTITIONER

## 2024-10-30 PROCEDURE — 80053 COMPREHEN METABOLIC PANEL: CPT | Performed by: NURSE PRACTITIONER

## 2024-10-30 PROCEDURE — 74019 RADEX ABDOMEN 2 VIEWS: CPT | Mod: TC | Performed by: RADIOLOGY

## 2024-10-30 PROCEDURE — 99214 OFFICE O/P EST MOD 30 MIN: CPT | Performed by: NURSE PRACTITIONER

## 2024-10-30 PROCEDURE — 82150 ASSAY OF AMYLASE: CPT | Performed by: NURSE PRACTITIONER

## 2024-10-30 PROCEDURE — 85652 RBC SED RATE AUTOMATED: CPT | Performed by: NURSE PRACTITIONER

## 2024-10-30 PROCEDURE — 87086 URINE CULTURE/COLONY COUNT: CPT | Performed by: NURSE PRACTITIONER

## 2024-10-30 PROCEDURE — 36415 COLL VENOUS BLD VENIPUNCTURE: CPT | Performed by: NURSE PRACTITIONER

## 2024-10-30 PROCEDURE — 81001 URINALYSIS AUTO W/SCOPE: CPT | Performed by: NURSE PRACTITIONER

## 2024-10-30 ASSESSMENT — PAIN SCALES - GENERAL: PAINLEVEL_OUTOF10: MILD PAIN (3)

## 2024-11-01 LAB — BACTERIA UR CULT: NORMAL

## 2024-11-04 ENCOUNTER — HOSPITAL ENCOUNTER (OUTPATIENT)
Dept: CT IMAGING | Facility: HOSPITAL | Age: 39
Discharge: HOME | End: 2024-11-04
Attending: NURSE PRACTITIONER | Admitting: NURSE PRACTITIONER
Payer: COMMERCIAL

## 2024-11-04 DIAGNOSIS — R10.12 ABDOMINAL PAIN, LEFT UPPER QUADRANT: ICD-10-CM

## 2024-11-04 DIAGNOSIS — R10.13 ABDOMINAL PAIN, EPIGASTRIC: ICD-10-CM

## 2024-11-04 DIAGNOSIS — R10.11 ABDOMINAL PAIN, RIGHT UPPER QUADRANT: ICD-10-CM

## 2024-11-04 PROCEDURE — 74177 CT ABD & PELVIS W/CONTRAST: CPT

## 2024-11-04 PROCEDURE — 250N000011 HC RX IP 250 OP 636: Performed by: RADIOLOGY

## 2024-11-04 RX ORDER — IOPAMIDOL 755 MG/ML
125 INJECTION, SOLUTION INTRAVASCULAR ONCE
Status: COMPLETED | OUTPATIENT
Start: 2024-11-04 | End: 2024-11-04

## 2024-11-04 RX ORDER — IOPAMIDOL 755 MG/ML
125 INJECTION, SOLUTION INTRAVASCULAR ONCE
Status: DISCONTINUED | OUTPATIENT
Start: 2024-11-04 | End: 2024-11-05 | Stop reason: HOSPADM

## 2024-11-04 RX ADMIN — IOPAMIDOL 125 ML: 755 INJECTION, SOLUTION INTRAVENOUS at 16:19

## 2024-11-06 ENCOUNTER — MYC MEDICAL ADVICE (OUTPATIENT)
Dept: FAMILY MEDICINE | Facility: OTHER | Age: 39
End: 2024-11-06

## 2024-11-06 DIAGNOSIS — R10.12 ABDOMINAL PAIN, LEFT UPPER QUADRANT: ICD-10-CM

## 2024-11-06 DIAGNOSIS — R10.11 ABDOMINAL PAIN, RIGHT UPPER QUADRANT: ICD-10-CM

## 2024-11-06 DIAGNOSIS — E78.5 HYPERLIPIDEMIA LDL GOAL <100: ICD-10-CM

## 2024-11-06 DIAGNOSIS — R10.13 ABDOMINAL PAIN, EPIGASTRIC: Primary | ICD-10-CM

## 2024-11-07 RX ORDER — ATORVASTATIN CALCIUM 20 MG/1
20 TABLET, FILM COATED ORAL AT BEDTIME
Qty: 90 TABLET | Refills: 3 | Status: SHIPPED | OUTPATIENT
Start: 2024-11-07

## 2025-03-25 DIAGNOSIS — K21.9 GASTROESOPHAGEAL REFLUX DISEASE WITHOUT ESOPHAGITIS: ICD-10-CM

## 2025-03-25 RX ORDER — OMEPRAZOLE 20 MG/1
20 CAPSULE, DELAYED RELEASE ORAL DAILY
Qty: 90 CAPSULE | Refills: 1 | Status: SHIPPED | OUTPATIENT
Start: 2025-03-25

## 2025-03-25 NOTE — TELEPHONE ENCOUNTER
Omeprazole (Prilosec) 20 mg capsule  Take 1 capsule (20 mg) by mouth daily    Last Written Prescription Date:  5-6-24  Last Fill Quantity: 90 capsule,   # refills: 1  Last Office Visit: 10-30-24  Future Office visit:

## 2025-08-22 ENCOUNTER — HOSPITAL ENCOUNTER (EMERGENCY)
Facility: HOSPITAL | Age: 40
Discharge: HOME | End: 2025-08-22
Attending: PHYSICIAN ASSISTANT | Admitting: PHYSICIAN ASSISTANT
Payer: COMMERCIAL

## 2025-08-22 ENCOUNTER — APPOINTMENT (OUTPATIENT)
Dept: CT IMAGING | Facility: HOSPITAL | Age: 40
End: 2025-08-22
Attending: PHYSICIAN ASSISTANT
Payer: COMMERCIAL

## 2025-08-22 VITALS
BODY MASS INDEX: 42.91 KG/M2 | HEART RATE: 86 BPM | OXYGEN SATURATION: 99 % | RESPIRATION RATE: 16 BRPM | TEMPERATURE: 97.6 F | HEIGHT: 64 IN

## 2025-08-22 DIAGNOSIS — K76.0 HEPATIC STEATOSIS: Primary | ICD-10-CM

## 2025-08-22 DIAGNOSIS — R10.9 ABDOMINAL PAIN: ICD-10-CM

## 2025-08-22 LAB
ALBUMIN SERPL BCG-MCNC: 4.1 G/DL (ref 3.5–5.2)
ALBUMIN UR-MCNC: NEGATIVE MG/DL
ALP SERPL-CCNC: 107 U/L (ref 40–150)
ALT SERPL W P-5'-P-CCNC: 109 U/L (ref 0–50)
ANION GAP SERPL CALCULATED.3IONS-SCNC: 13 MMOL/L (ref 7–15)
APPEARANCE UR: ABNORMAL
AST SERPL W P-5'-P-CCNC: 107 U/L (ref 0–45)
BASOPHILS # BLD AUTO: 0.04 10E3/UL (ref 0–0.2)
BASOPHILS NFR BLD AUTO: 0.4 %
BILIRUB SERPL-MCNC: 0.4 MG/DL
BILIRUB UR QL STRIP: NEGATIVE
BUN SERPL-MCNC: 9.1 MG/DL (ref 6–20)
CALCIUM SERPL-MCNC: 9.7 MG/DL (ref 8.8–10.4)
CHLORIDE SERPL-SCNC: 101 MMOL/L (ref 98–107)
COLOR UR AUTO: ABNORMAL
CREAT SERPL-MCNC: 0.64 MG/DL (ref 0.51–0.95)
CRP SERPL-MCNC: 14.18 MG/L
EGFRCR SERPLBLD CKD-EPI 2021: >90 ML/MIN/1.73M2
EOSINOPHIL # BLD AUTO: 0.05 10E3/UL (ref 0–0.7)
EOSINOPHIL NFR BLD AUTO: 0.5 %
ERYTHROCYTE [DISTWIDTH] IN BLOOD BY AUTOMATED COUNT: 13.9 % (ref 10–15)
GLUCOSE SERPL-MCNC: 121 MG/DL (ref 70–99)
GLUCOSE UR STRIP-MCNC: NEGATIVE MG/DL
HCO3 SERPL-SCNC: 25 MMOL/L (ref 22–29)
HCT VFR BLD AUTO: 44.5 % (ref 35–47)
HGB BLD-MCNC: 14.5 G/DL (ref 11.7–15.7)
HGB UR QL STRIP: ABNORMAL
HOLD SPECIMEN: NORMAL
HOLD SPECIMEN: NORMAL
HYALINE CASTS: 5 /LPF
IMM GRANULOCYTES # BLD: 0.08 10E3/UL
IMM GRANULOCYTES NFR BLD: 0.7 %
KETONES UR STRIP-MCNC: ABNORMAL MG/DL
LACTATE SERPL-SCNC: 1.9 MMOL/L (ref 0.7–2)
LEUKOCYTE ESTERASE UR QL STRIP: NEGATIVE
LIPASE SERPL-CCNC: 30 U/L (ref 13–60)
LYMPHOCYTES # BLD AUTO: 3.01 10E3/UL (ref 0.8–5.3)
LYMPHOCYTES NFR BLD AUTO: 27.3 %
MCH RBC QN AUTO: 28 PG (ref 26.5–33)
MCHC RBC AUTO-ENTMCNC: 32.6 G/DL (ref 31.5–36.5)
MCV RBC AUTO: 86.1 FL (ref 78–100)
MONOCYTES # BLD AUTO: 0.74 10E3/UL (ref 0–1.3)
MONOCYTES NFR BLD AUTO: 6.7 %
MUCOUS THREADS #/AREA URNS LPF: PRESENT /LPF
NEUTROPHILS # BLD AUTO: 7.09 10E3/UL (ref 1.6–8.3)
NEUTROPHILS NFR BLD AUTO: 64.4 %
NITRATE UR QL: NEGATIVE
NRBC # BLD AUTO: <0.03 10E3/UL
NRBC BLD AUTO-RTO: 0 /100
PH UR STRIP: 5.5 [PH] (ref 4.7–8)
PLATELET # BLD AUTO: 394 10E3/UL (ref 150–450)
POTASSIUM SERPL-SCNC: 4.2 MMOL/L (ref 3.4–5.3)
PROT SERPL-MCNC: 7.4 G/DL (ref 6.4–8.3)
RBC # BLD AUTO: 5.17 10E6/UL (ref 3.8–5.2)
RBC URINE: >182 /HPF
SODIUM SERPL-SCNC: 139 MMOL/L (ref 135–145)
SP GR UR STRIP: 1.02 (ref 1–1.03)
SQUAMOUS EPITHELIAL: <1 /HPF
UROBILINOGEN UR STRIP-MCNC: NORMAL MG/DL
WBC # BLD AUTO: 11.01 10E3/UL (ref 4–11)
WBC URINE: 14 /HPF

## 2025-08-22 PROCEDURE — 86140 C-REACTIVE PROTEIN: CPT | Performed by: PHYSICIAN ASSISTANT

## 2025-08-22 PROCEDURE — 99285 EMERGENCY DEPT VISIT HI MDM: CPT | Mod: 25 | Performed by: PHYSICIAN ASSISTANT

## 2025-08-22 PROCEDURE — 99284 EMERGENCY DEPT VISIT MOD MDM: CPT | Performed by: PHYSICIAN ASSISTANT

## 2025-08-22 PROCEDURE — 83605 ASSAY OF LACTIC ACID: CPT | Performed by: PHYSICIAN ASSISTANT

## 2025-08-22 PROCEDURE — 87086 URINE CULTURE/COLONY COUNT: CPT | Performed by: PHYSICIAN ASSISTANT

## 2025-08-22 PROCEDURE — 82310 ASSAY OF CALCIUM: CPT | Performed by: PHYSICIAN ASSISTANT

## 2025-08-22 PROCEDURE — 83690 ASSAY OF LIPASE: CPT | Performed by: PHYSICIAN ASSISTANT

## 2025-08-22 PROCEDURE — 36415 COLL VENOUS BLD VENIPUNCTURE: CPT | Performed by: PHYSICIAN ASSISTANT

## 2025-08-22 PROCEDURE — 81001 URINALYSIS AUTO W/SCOPE: CPT | Performed by: PHYSICIAN ASSISTANT

## 2025-08-22 PROCEDURE — 250N000011 HC RX IP 250 OP 636: Performed by: RADIOLOGY

## 2025-08-22 PROCEDURE — 93005 ELECTROCARDIOGRAM TRACING: CPT

## 2025-08-22 PROCEDURE — 85018 HEMOGLOBIN: CPT | Performed by: PHYSICIAN ASSISTANT

## 2025-08-22 PROCEDURE — 93010 ELECTROCARDIOGRAM REPORT: CPT | Performed by: INTERNAL MEDICINE

## 2025-08-22 RX ORDER — IOPAMIDOL 755 MG/ML
122 INJECTION, SOLUTION INTRAVASCULAR ONCE
Status: COMPLETED | OUTPATIENT
Start: 2025-08-22 | End: 2025-08-22

## 2025-08-22 RX ADMIN — IOPAMIDOL 122 ML: 755 INJECTION, SOLUTION INTRAVENOUS at 14:16

## 2025-08-22 ASSESSMENT — ACTIVITIES OF DAILY LIVING (ADL)
ADLS_ACUITY_SCORE: 41

## 2025-08-22 ASSESSMENT — COLUMBIA-SUICIDE SEVERITY RATING SCALE - C-SSRS
6. HAVE YOU EVER DONE ANYTHING, STARTED TO DO ANYTHING, OR PREPARED TO DO ANYTHING TO END YOUR LIFE?: NO
2. HAVE YOU ACTUALLY HAD ANY THOUGHTS OF KILLING YOURSELF IN THE PAST MONTH?: NO
1. IN THE PAST MONTH, HAVE YOU WISHED YOU WERE DEAD OR WISHED YOU COULD GO TO SLEEP AND NOT WAKE UP?: NO

## 2025-08-24 LAB — BACTERIA UR CULT: NORMAL

## 2025-08-26 LAB
ATRIAL RATE - MUSE: 75 BPM
DIASTOLIC BLOOD PRESSURE - MUSE: NORMAL MMHG
INTERPRETATION ECG - MUSE: NORMAL
P AXIS - MUSE: 69 DEGREES
PR INTERVAL - MUSE: 152 MS
QRS DURATION - MUSE: 72 MS
QT - MUSE: 368 MS
QTC - MUSE: 410 MS
R AXIS - MUSE: 27 DEGREES
SYSTOLIC BLOOD PRESSURE - MUSE: NORMAL MMHG
T AXIS - MUSE: 46 DEGREES
VENTRICULAR RATE- MUSE: 75 BPM

## 2025-08-28 ENCOUNTER — PATIENT OUTREACH (OUTPATIENT)
Dept: CARE COORDINATION | Facility: CLINIC | Age: 40
End: 2025-08-28

## 2025-08-29 PROBLEM — G89.29 CHRONIC BILATERAL LOW BACK PAIN: Status: ACTIVE | Noted: 2025-08-29

## 2025-08-29 PROBLEM — M54.50 CHRONIC BILATERAL LOW BACK PAIN: Status: ACTIVE | Noted: 2025-08-29

## 2025-09-03 ENCOUNTER — THERAPY VISIT (OUTPATIENT)
Dept: PHYSICAL THERAPY | Facility: OTHER | Age: 40
End: 2025-09-03
Attending: NURSE PRACTITIONER
Payer: COMMERCIAL

## 2025-09-03 DIAGNOSIS — G89.29 CHRONIC BILATERAL LOW BACK PAIN WITHOUT SCIATICA: Primary | ICD-10-CM

## 2025-09-03 DIAGNOSIS — M54.50 CHRONIC BILATERAL LOW BACK PAIN WITHOUT SCIATICA: Primary | ICD-10-CM

## (undated) DEVICE — TUBE NASOGASTRIC 18FR 48" 2 LUMEN 8888266148

## (undated) DEVICE — SUCTION STRYKERFLOW II 250-070-500

## (undated) DEVICE — DRESSING MEPILEX BORDER AG 3" X 3" (7.5CM X 7.5CM) 395290

## (undated) DEVICE — ENDO TROCAR SLEEVE KII ADV FIXATION 05X100MM CFS02

## (undated) DEVICE — ELECTRODE CAUTERY LAP L-HOOK W/SHAFT 33CM 0020S

## (undated) DEVICE — EVAC SYSTEM CLEAR FLOW SC082500

## (undated) DEVICE — ESU CORD MONOPOLAR 10'  E0510

## (undated) DEVICE — BLANKET BAIR HUGGER UPPER BODY 42268

## (undated) DEVICE — SU VICRYL 0 UR-6 27" J603H

## (undated) DEVICE — ENDO TROCAR FIRST ENTRY KII FIOS ADV FIX 05X100MM CFF03

## (undated) DEVICE — PREP CHLORAPREP 26ML TINTED HI-LITE ORANGE 930815

## (undated) DEVICE — GLOVE 8.5 PROTEXIS PI CLSC PF BD CUF STRL LF 12IN 2D72PL85X

## (undated) DEVICE — PACK LAPAROSCOPY CUSTOM SBACNLSMBF

## (undated) DEVICE — SUCTION MANIFOLD NEPTUNE 2 SYS 1 PORT 702-025-000

## (undated) DEVICE — GOWN SURG XL LVL 3 REINFORCED 9541

## (undated) DEVICE — SOL NACL 0.9% INJ 1000ML BAG 2B1324X

## (undated) DEVICE — ESU ENDO SCISSORS 5MM CVD 5DCS

## (undated) DEVICE — SLEEVE SCD EXPRESS KNEE LENGTH MED 9529

## (undated) DEVICE — COVER LT HANDLE 2/PK 5160-2FG

## (undated) DEVICE — LABEL STERILE PREPRINTED FOR OR FRRH01-2M

## (undated) DEVICE — SYSTEM CLEARIFY VISUALIZATION 21-345

## (undated) DEVICE — ENDO TROCAR BLUNT TIP KII BALLOON 12X100MM C0R47

## (undated) DEVICE — ESU GROUND PAD ADULT W/CORD E7507

## (undated) DEVICE — SOL WATER IRRIG 1000ML BOTTLE 2F7114

## (undated) DEVICE — PACK BASIN SET UP SUTCNBSBBA

## (undated) DEVICE — SOL NACL 0.9% IRRIG 1000ML BOTTLE 2F7124

## (undated) DEVICE — CANISTER SUCTION MEDI-VAC GUARDIAN 2000ML 90D 65651-220

## (undated) DEVICE — TUBING INSUFFLATION INSUFFLATOR FILTER HIGH FLOW 031322-01

## (undated) DEVICE — STPL SKIN 35W 6.9MM  PXW35

## (undated) DEVICE — CLIP APPLIER ENDO 5MM M/L LIGAMAX EL5ML

## (undated) DEVICE — ENDO POUCH UNIV RETRIEVAL SYSTEM INZII 10MM CD001

## (undated) DEVICE — SU MONOCRYL 4-0 PS-2 18" UND Y496G

## (undated) RX ORDER — PROPOFOL 10 MG/ML
INJECTION, EMULSION INTRAVENOUS
Status: DISPENSED
Start: 2024-06-03

## (undated) RX ORDER — FENTANYL CITRATE 50 UG/ML
INJECTION, SOLUTION INTRAMUSCULAR; INTRAVENOUS
Status: DISPENSED
Start: 2024-06-03

## (undated) RX ORDER — METOPROLOL TARTRATE 1 MG/ML
INJECTION, SOLUTION INTRAVENOUS
Status: DISPENSED
Start: 2024-06-03

## (undated) RX ORDER — ONDANSETRON 2 MG/ML
INJECTION INTRAMUSCULAR; INTRAVENOUS
Status: DISPENSED
Start: 2024-06-03

## (undated) RX ORDER — DEXMEDETOMIDINE HYDROCHLORIDE 4 UG/ML
INJECTION, SOLUTION INTRAVENOUS
Status: DISPENSED
Start: 2024-06-03

## (undated) RX ORDER — DEXAMETHASONE SODIUM PHOSPHATE 10 MG/ML
INJECTION, SOLUTION INTRAMUSCULAR; INTRAVENOUS
Status: DISPENSED
Start: 2024-06-03

## (undated) RX ORDER — LABETALOL 20 MG/4 ML (5 MG/ML) INTRAVENOUS SYRINGE
Status: DISPENSED
Start: 2024-06-03